# Patient Record
Sex: FEMALE | Race: BLACK OR AFRICAN AMERICAN | NOT HISPANIC OR LATINO | Employment: FULL TIME | URBAN - METROPOLITAN AREA
[De-identification: names, ages, dates, MRNs, and addresses within clinical notes are randomized per-mention and may not be internally consistent; named-entity substitution may affect disease eponyms.]

---

## 2019-01-24 ENCOUNTER — HOSPITAL ENCOUNTER (EMERGENCY)
Facility: HOSPITAL | Age: 59
Discharge: HOME | End: 2019-01-24
Attending: EMERGENCY MEDICINE
Payer: COMMERCIAL

## 2019-01-24 VITALS
HEART RATE: 67 BPM | HEIGHT: 66 IN | OXYGEN SATURATION: 100 % | BODY MASS INDEX: 27.64 KG/M2 | TEMPERATURE: 99 F | WEIGHT: 172 LBS | RESPIRATION RATE: 17 BRPM | DIASTOLIC BLOOD PRESSURE: 96 MMHG | SYSTOLIC BLOOD PRESSURE: 168 MMHG

## 2019-01-24 DIAGNOSIS — R07.89 ATYPICAL CHEST PAIN: ICD-10-CM

## 2019-01-24 DIAGNOSIS — I16.0 ASYMPTOMATIC HYPERTENSIVE URGENCY: Primary | ICD-10-CM

## 2019-01-24 LAB
ALBUMIN SERPL-MCNC: 3.9 G/DL (ref 3.3–5.5)
ALP SERPL-CCNC: 80 U/L (ref 42–141)
BILIRUB SERPL-MCNC: 0.7 MG/DL (ref 0.2–1.6)
BILIRUBIN, POC UA: NEGATIVE
BLOOD, POC UA: NEGATIVE
BUN SERPL-MCNC: 14 MG/DL (ref 7–22)
CALCIUM SERPL-MCNC: 9.2 MG/DL (ref 8–10.3)
CHLORIDE SERPL-SCNC: 104 MMOL/L (ref 98–108)
CLARITY, POC UA: ABNORMAL
COLOR, POC UA: YELLOW
CREAT SERPL-MCNC: 0.8 MG/DL (ref 0.6–1.2)
GLUCOSE SERPL-MCNC: 101 MG/DL (ref 73–118)
GLUCOSE, POC UA: NEGATIVE
KETONES, POC UA: NEGATIVE
LEUKOCYTE EST, POC UA: ABNORMAL
NITRITE, POC UA: NEGATIVE
PH UR STRIP: 7 [PH]
POC ALT (SGPT): 28 U/L (ref 10–47)
POC AST (SGOT): 28 U/L (ref 11–38)
POC TCO2: 27 MMOL/L (ref 18–33)
POTASSIUM BLD-SCNC: 3.5 MMOL/L (ref 3.6–5.1)
PROTEIN, POC UA: NEGATIVE
PROTEIN, POC: 7.9 G/DL (ref 6.4–8.1)
SODIUM BLD-SCNC: 141 MMOL/L (ref 128–145)
SPECIFIC GRAVITY, POC UA: 1.02
UROBILINOGEN, POC UA: 0.2 E.U./DL

## 2019-01-24 PROCEDURE — 81003 URINALYSIS AUTO W/O SCOPE: CPT | Mod: ER

## 2019-01-24 PROCEDURE — 85025 COMPLETE CBC W/AUTO DIFF WBC: CPT | Mod: ER

## 2019-01-24 PROCEDURE — 25000003 PHARM REV CODE 250: Mod: ER | Performed by: EMERGENCY MEDICINE

## 2019-01-24 PROCEDURE — 93010 EKG 12-LEAD: ICD-10-PCS | Mod: ,,, | Performed by: INTERNAL MEDICINE

## 2019-01-24 PROCEDURE — 80053 COMPREHEN METABOLIC PANEL: CPT | Mod: ER

## 2019-01-24 PROCEDURE — 93005 ELECTROCARDIOGRAM TRACING: CPT | Mod: ER

## 2019-01-24 PROCEDURE — 99283 EMERGENCY DEPT VISIT LOW MDM: CPT | Mod: ER

## 2019-01-24 PROCEDURE — 93010 ELECTROCARDIOGRAM REPORT: CPT | Mod: ,,, | Performed by: INTERNAL MEDICINE

## 2019-01-24 RX ORDER — AMLODIPINE BESYLATE 5 MG/1
5 TABLET ORAL DAILY
Qty: 90 TABLET | Refills: 0 | Status: SHIPPED | OUTPATIENT
Start: 2019-01-24 | End: 2023-03-15

## 2019-01-24 RX ORDER — LABETALOL 100 MG/1
100 TABLET, FILM COATED ORAL 2 TIMES DAILY
COMMUNITY
End: 2023-03-15

## 2019-01-24 RX ORDER — AMLODIPINE BESYLATE 5 MG/1
5 TABLET ORAL
Status: COMPLETED | OUTPATIENT
Start: 2019-01-24 | End: 2019-01-24

## 2019-01-24 RX ORDER — AMLODIPINE BESYLATE 5 MG/1
5 TABLET ORAL 2 TIMES DAILY
Qty: 180 TABLET | Refills: 0 | Status: SHIPPED | OUTPATIENT
Start: 2019-01-24 | End: 2019-01-24 | Stop reason: SDUPTHER

## 2019-01-24 RX ADMIN — AMLODIPINE BESYLATE 5 MG: 5 TABLET ORAL at 03:01

## 2019-01-24 NOTE — DISCHARGE INSTRUCTIONS
It is important that you followed up to determine if the medications that I prescribed you needs to be adjusted.  I have listed few different sources that you can call to get an appointment.

## 2019-01-24 NOTE — ED PROVIDER NOTES
Encounter Date: 2019    SCRIBE #1 NOTE: I, Carolee Meléndez, am scribing for, and in the presence of,  Dr. Ruiz. I have scribed the following portions of the note - Other sections scribed: HPI, ROS, PE.       History     Chief Complaint   Patient presents with    high blood pressure    tingling in my chest     Akiko Esparza is a 58 y.o. female who presents to the ED s/p a high blood pressure reading at a clinic today with mild tingling in her chest, which she has been having for one year. She was out of her BP medication due to lack of insurance, but she recently got insurance again. She saw a doctor at a clinic today (not the clinic she normally goes to) and was told she might have a cardiac issue or stroke due to her high blood pressure and tingling in chest. She was not given any antihypertensives today at the clinic.  Denies weakness in arms or legs, speech difficulty, or headache. No peripheral edema.  No shortness of breath.      The history is provided by the patient. No  was used.     Review of patient's allergies indicates:  No Known Allergies  Past Medical History:   Diagnosis Date    GERD (gastroesophageal reflux disease)     Hypertension      Past Surgical History:   Procedure Laterality Date     SECTION       &      Family History   Problem Relation Age of Onset    Arthritis Mother     Diabetes Mother     Hypertension Mother     Vision loss Mother         wears glasses    Arthritis Father     Diabetes Father         borderline-DrDain's are monitoring    Hypertension Father     Vision loss Father         wears glasses    Arthritis Sister     Asthma Sister     Hypertension Sister         borderline    Vision loss Sister         wears glasses    Vision loss Daughter         wears glasses    Stroke Paternal Grandfather     Breast cancer Neg Hx     Colon cancer Neg Hx     Ovarian cancer Neg Hx      Social History     Tobacco Use    Smoking  status: Never Smoker    Smokeless tobacco: Never Used   Substance Use Topics    Alcohol use: Yes     Alcohol/week: 0.5 oz     Types: 1 Standard drinks or equivalent per week    Drug use: No     Review of Systems   Constitutional: Negative for appetite change.   Eyes: Negative for visual disturbance.   Respiratory: Negative for shortness of breath.    Cardiovascular: Positive for chest pain (tingling in chest).   Gastrointestinal: Negative for constipation and diarrhea.   Genitourinary: Negative for difficulty urinating.   Neurological: Negative for dizziness, weakness and headaches.   All other systems reviewed and are negative.      Physical Exam     Initial Vitals [01/24/19 1532]   BP Pulse Resp Temp SpO2   (!) 240/145 84 18 -- 100 %      MAP       --         Physical Exam    Nursing note and vitals reviewed.  Constitutional: She appears well-developed and well-nourished. She appears distressed (minimal).   HENT:   Head: Normocephalic and atraumatic.   Right Ear: External ear normal.   Left Ear: External ear normal.   Eyes: Conjunctivae are normal.   Neck: Normal range of motion. Neck supple. No JVD present.   Cardiovascular: Normal rate, regular rhythm and intact distal pulses.   Murmur (soft, systolic) heard.  Pulmonary/Chest: Effort normal and breath sounds normal. No respiratory distress. She has no wheezes. She has no rhonchi. She has no rales.   Mild tingling in anterior chest wall.   Abdominal: Soft. There is no tenderness.   Musculoskeletal: Normal range of motion.   Neurological: She is alert and oriented to person, place, and time. She has normal strength. No cranial nerve deficit or sensory deficit. Coordination and gait normal. GCS eye subscore is 4. GCS verbal subscore is 5. GCS motor subscore is 6.   The patient has no nystagmus.  There is normal finger to nose and rapid alternating hand movements.  There is no ataxia.  There are no visual field defects.     Skin: Skin is warm and dry.    Psychiatric: She has a normal mood and affect. Her behavior is normal.         ED Course   Procedures  Labs Reviewed   URINALYSIS, REFLEX TO URINE CULTURE   POCT CBC   POCT CMP          Imaging Results    None          Medical Decision Making:   Clinical Tests:   Medical Tests: Ordered and Reviewed            Scribe Attestation:   Scribe #1: I performed the above scribed service and the documentation accurately describes the services I performed. I attest to the accuracy of the note.    I attest that I personally performed the services documented by the scribe and acknowledged and confirm the content of the note. Pedro Ruiz              ED Course as of Jan 24 1615   Thu Jan 24, 2019   1550 My independent interpretation of the EKG is normal sinus rhythm with normal axis and normal intervals.  There is no ST segment elevation or depression. EKG 12-lead []   1613 CMP is within normal limits POC Potassium: (!) 3.5 [MH]   1614 UA is within normal limits Glucose, UA: (!) Negative [MH]   1614 CBC is within normal limits  []      ED Course User Index  [MH] Pedro Ruiz MD     Clinical Impression:     1. Asymptomatic hypertensive urgency    2. Atypical chest pain                                   Pedro Ruiz MD  01/24/19 1550       Pedro Ruiz MD  01/24/19 1616

## 2021-03-25 ENCOUNTER — HOSPITAL ENCOUNTER (OUTPATIENT)
Dept: RADIOLOGY | Facility: HOSPITAL | Age: 61
Discharge: HOME | End: 2021-03-25
Attending: OBSTETRICS & GYNECOLOGY
Payer: COMMERCIAL

## 2021-03-25 ENCOUNTER — OFFICE VISIT (OUTPATIENT)
Dept: OBSTETRICS AND GYNECOLOGY | Facility: CLINIC | Age: 61
End: 2021-03-25
Attending: OBSTETRICS & GYNECOLOGY
Payer: COMMERCIAL

## 2021-03-25 VITALS
SYSTOLIC BLOOD PRESSURE: 160 MMHG | WEIGHT: 172.38 LBS | HEIGHT: 66 IN | DIASTOLIC BLOOD PRESSURE: 110 MMHG | BODY MASS INDEX: 27.7 KG/M2

## 2021-03-25 VITALS — HEIGHT: 66 IN | BODY MASS INDEX: 27.64 KG/M2 | WEIGHT: 172 LBS

## 2021-03-25 DIAGNOSIS — Z01.419 WELL WOMAN EXAM: Primary | ICD-10-CM

## 2021-03-25 DIAGNOSIS — Z12.31 ENCOUNTER FOR SCREENING MAMMOGRAM FOR MALIGNANT NEOPLASM OF BREAST: ICD-10-CM

## 2021-03-25 PROCEDURE — 87624 HPV HI-RISK TYP POOLED RSLT: CPT | Performed by: OBSTETRICS & GYNECOLOGY

## 2021-03-25 PROCEDURE — 99386 PR PREVENTIVE VISIT,NEW,40-64: ICD-10-PCS | Mod: S$GLB,,, | Performed by: OBSTETRICS & GYNECOLOGY

## 2021-03-25 PROCEDURE — 99386 PREV VISIT NEW AGE 40-64: CPT | Mod: S$GLB,,, | Performed by: OBSTETRICS & GYNECOLOGY

## 2021-03-25 PROCEDURE — 77067 MAMMO DIGITAL SCREENING BILAT WITH TOMO: ICD-10-PCS | Mod: 26,,, | Performed by: RADIOLOGY

## 2021-03-25 PROCEDURE — 88175 CYTOPATH C/V AUTO FLUID REDO: CPT | Performed by: OBSTETRICS & GYNECOLOGY

## 2021-03-25 PROCEDURE — 77067 SCR MAMMO BI INCL CAD: CPT | Mod: TC

## 2021-03-25 PROCEDURE — 99999 PR PBB SHADOW E&M-EST. PATIENT-LVL III: CPT | Mod: PBBFAC,,, | Performed by: OBSTETRICS & GYNECOLOGY

## 2021-03-25 PROCEDURE — 77067 SCR MAMMO BI INCL CAD: CPT | Mod: 26,,, | Performed by: RADIOLOGY

## 2021-03-25 PROCEDURE — 77063 BREAST TOMOSYNTHESIS BI: CPT | Mod: 26,,, | Performed by: RADIOLOGY

## 2021-03-25 PROCEDURE — 77063 MAMMO DIGITAL SCREENING BILAT WITH TOMO: ICD-10-PCS | Mod: 26,,, | Performed by: RADIOLOGY

## 2021-03-25 PROCEDURE — 99999 PR PBB SHADOW E&M-EST. PATIENT-LVL III: ICD-10-PCS | Mod: PBBFAC,,, | Performed by: OBSTETRICS & GYNECOLOGY

## 2021-03-25 RX ORDER — DICYCLOMINE HYDROCHLORIDE 20 MG/1
20 TABLET ORAL
COMMUNITY
Start: 2020-11-20 | End: 2021-11-20

## 2021-03-25 RX ORDER — OLMESARTAN MEDOXOMIL 20 MG/1
20 TABLET ORAL
COMMUNITY
Start: 2020-11-20 | End: 2021-11-20

## 2021-03-25 RX ORDER — CIMETIDINE 400 MG/1
800 TABLET, FILM COATED ORAL
COMMUNITY
Start: 2021-03-23 | End: 2023-03-15

## 2021-03-29 LAB
FINAL PATHOLOGIC DIAGNOSIS: NORMAL
Lab: NORMAL

## 2021-03-31 LAB
HPV HR 12 DNA SPEC QL NAA+PROBE: NEGATIVE
HPV16 AG SPEC QL: NEGATIVE
HPV18 DNA SPEC QL NAA+PROBE: NEGATIVE

## 2021-04-15 ENCOUNTER — PATIENT MESSAGE (OUTPATIENT)
Dept: RESEARCH | Facility: HOSPITAL | Age: 61
End: 2021-04-15

## 2021-08-15 ENCOUNTER — PATIENT MESSAGE (OUTPATIENT)
Dept: OBSTETRICS AND GYNECOLOGY | Facility: CLINIC | Age: 61
End: 2021-08-15

## 2021-09-11 ENCOUNTER — TELEPHONE (OUTPATIENT)
Dept: OBSTETRICS AND GYNECOLOGY | Facility: CLINIC | Age: 61
End: 2021-09-11

## 2023-03-15 ENCOUNTER — OFFICE VISIT (OUTPATIENT)
Dept: OBSTETRICS AND GYNECOLOGY | Facility: CLINIC | Age: 63
End: 2023-03-15
Payer: COMMERCIAL

## 2023-03-15 ENCOUNTER — TELEPHONE (OUTPATIENT)
Dept: OBSTETRICS AND GYNECOLOGY | Facility: CLINIC | Age: 63
End: 2023-03-15
Payer: COMMERCIAL

## 2023-03-15 ENCOUNTER — HOSPITAL ENCOUNTER (EMERGENCY)
Facility: HOSPITAL | Age: 63
Discharge: HOME | End: 2023-03-15
Attending: EMERGENCY MEDICINE
Payer: COMMERCIAL

## 2023-03-15 VITALS
WEIGHT: 171.94 LBS | BODY MASS INDEX: 27.63 KG/M2 | HEIGHT: 66 IN | SYSTOLIC BLOOD PRESSURE: 200 MMHG | DIASTOLIC BLOOD PRESSURE: 120 MMHG

## 2023-03-15 VITALS
DIASTOLIC BLOOD PRESSURE: 90 MMHG | HEART RATE: 60 BPM | WEIGHT: 183 LBS | TEMPERATURE: 98 F | OXYGEN SATURATION: 98 % | BODY MASS INDEX: 29.41 KG/M2 | SYSTOLIC BLOOD PRESSURE: 144 MMHG | HEIGHT: 66 IN | RESPIRATION RATE: 20 BRPM

## 2023-03-15 DIAGNOSIS — Z12.31 SCREENING MAMMOGRAM FOR BREAST CANCER: ICD-10-CM

## 2023-03-15 DIAGNOSIS — N89.8 VAGINAL IRRITATION: Primary | ICD-10-CM

## 2023-03-15 DIAGNOSIS — Z12.4 SCREENING FOR CERVICAL CANCER: ICD-10-CM

## 2023-03-15 DIAGNOSIS — N95.8 GENITOURINARY SYNDROME OF MENOPAUSE: ICD-10-CM

## 2023-03-15 DIAGNOSIS — I16.0 HYPERTENSIVE URGENCY: Primary | ICD-10-CM

## 2023-03-15 DIAGNOSIS — Z11.51 SCREENING FOR HPV (HUMAN PAPILLOMAVIRUS): ICD-10-CM

## 2023-03-15 LAB
ALBUMIN SERPL-MCNC: 4.3 G/DL (ref 3.3–5.5)
ALP SERPL-CCNC: 88 U/L (ref 42–141)
BILIRUB SERPL-MCNC: 0.7 MG/DL (ref 0.2–1.6)
BILIRUBIN, POC UA: NEGATIVE
BLOOD, POC UA: NEGATIVE
BUN SERPL-MCNC: 15 MG/DL (ref 7–22)
CALCIUM SERPL-MCNC: 9.8 MG/DL (ref 8–10.3)
CHLORIDE SERPL-SCNC: 104 MMOL/L (ref 98–108)
CLARITY, POC UA: CLEAR
COLOR, POC UA: ABNORMAL
CREAT SERPL-MCNC: 0.9 MG/DL (ref 0.6–1.2)
GLUCOSE SERPL-MCNC: 98 MG/DL (ref 73–118)
GLUCOSE, POC UA: NEGATIVE
KETONES, POC UA: NEGATIVE
LEUKOCYTE EST, POC UA: ABNORMAL
NITRITE, POC UA: NEGATIVE
PH UR STRIP: 7 [PH]
POC ALT (SGPT): 20 U/L (ref 10–47)
POC AST (SGOT): 24 U/L (ref 11–38)
POC TCO2: 27 MMOL/L (ref 18–33)
POCT GLUCOSE: 91 MG/DL (ref 70–110)
POTASSIUM BLD-SCNC: 3.6 MMOL/L (ref 3.6–5.1)
PROTEIN, POC UA: NEGATIVE
PROTEIN, POC: 8.3 G/DL (ref 6.4–8.1)
SODIUM BLD-SCNC: 142 MMOL/L (ref 128–145)
SPECIFIC GRAVITY, POC UA: 1.02
UROBILINOGEN, POC UA: 0.2 E.U./DL

## 2023-03-15 PROCEDURE — 82962 GLUCOSE BLOOD TEST: CPT | Mod: ER

## 2023-03-15 PROCEDURE — 99999 PR PBB SHADOW E&M-EST. PATIENT-LVL III: ICD-10-PCS | Mod: PBBFAC,,, | Performed by: STUDENT IN AN ORGANIZED HEALTH CARE EDUCATION/TRAINING PROGRAM

## 2023-03-15 PROCEDURE — 99283 EMERGENCY DEPT VISIT LOW MDM: CPT | Mod: ER,25

## 2023-03-15 PROCEDURE — 81003 URINALYSIS AUTO W/O SCOPE: CPT | Mod: ER

## 2023-03-15 PROCEDURE — 85025 COMPLETE CBC W/AUTO DIFF WBC: CPT | Mod: ER

## 2023-03-15 PROCEDURE — 99214 PR OFFICE/OUTPT VISIT, EST, LEVL IV, 30-39 MIN: ICD-10-PCS | Mod: S$GLB,,, | Performed by: STUDENT IN AN ORGANIZED HEALTH CARE EDUCATION/TRAINING PROGRAM

## 2023-03-15 PROCEDURE — 99214 OFFICE O/P EST MOD 30 MIN: CPT | Mod: S$GLB,,, | Performed by: STUDENT IN AN ORGANIZED HEALTH CARE EDUCATION/TRAINING PROGRAM

## 2023-03-15 PROCEDURE — 88175 CYTOPATH C/V AUTO FLUID REDO: CPT | Performed by: STUDENT IN AN ORGANIZED HEALTH CARE EDUCATION/TRAINING PROGRAM

## 2023-03-15 PROCEDURE — 99999 PR PBB SHADOW E&M-EST. PATIENT-LVL III: CPT | Mod: PBBFAC,,, | Performed by: STUDENT IN AN ORGANIZED HEALTH CARE EDUCATION/TRAINING PROGRAM

## 2023-03-15 PROCEDURE — 80053 COMPREHEN METABOLIC PANEL: CPT | Mod: ER

## 2023-03-15 PROCEDURE — 25000003 PHARM REV CODE 250: Mod: ER | Performed by: EMERGENCY MEDICINE

## 2023-03-15 PROCEDURE — 81514 NFCT DS BV&VAGINITIS DNA ALG: CPT | Performed by: STUDENT IN AN ORGANIZED HEALTH CARE EDUCATION/TRAINING PROGRAM

## 2023-03-15 PROCEDURE — 87624 HPV HI-RISK TYP POOLED RSLT: CPT | Performed by: STUDENT IN AN ORGANIZED HEALTH CARE EDUCATION/TRAINING PROGRAM

## 2023-03-15 PROCEDURE — 96374 THER/PROPH/DIAG INJ IV PUSH: CPT | Mod: ER

## 2023-03-15 RX ORDER — CLONIDINE HYDROCHLORIDE 0.1 MG/1
0.1 TABLET ORAL
Status: COMPLETED | OUTPATIENT
Start: 2023-03-15 | End: 2023-03-15

## 2023-03-15 RX ORDER — ESTRADIOL 0.1 MG/G
0.5 CREAM VAGINAL NIGHTLY
Qty: 42.5 G | Refills: 1 | Status: SHIPPED | OUTPATIENT
Start: 2023-03-15 | End: 2024-03-13

## 2023-03-15 RX ORDER — IBUPROFEN 600 MG/1
600 TABLET ORAL
Status: DISCONTINUED | OUTPATIENT
Start: 2023-03-15 | End: 2023-03-15 | Stop reason: HOSPADM

## 2023-03-15 RX ORDER — LABETALOL HYDROCHLORIDE 5 MG/ML
20 INJECTION, SOLUTION INTRAVENOUS
Status: DISCONTINUED | OUTPATIENT
Start: 2023-03-15 | End: 2023-03-15

## 2023-03-15 RX ORDER — OLMESARTAN MEDOXOMIL 20 MG/1
20 TABLET ORAL
COMMUNITY
Start: 2022-10-27

## 2023-03-15 RX ORDER — FLUTICASONE PROPIONATE 50 MCG
1 SPRAY, SUSPENSION (ML) NASAL
COMMUNITY
Start: 2022-04-13 | End: 2023-04-13

## 2023-03-15 RX ADMIN — CLONIDINE HYDROCHLORIDE 0.1 MG: 0.1 TABLET ORAL at 04:03

## 2023-03-15 NOTE — PROGRESS NOTES
OBSTETRICS AND GYNECOLOGY    Subjective:      Chief Complaint:  Vaginal Dryness    HPI:  Akiko Esparza is an 62 y.o.  presenting with concerns of vaginal dryness/discomfort.  Started a few years ago. Appreciated on both internal and external aspects, more prominent on external aspect. No rash, bleeding, itch, odor, or discharge. No prior episodes. LMP about 20 years ago. No new partners. No new soap, detergent, laundry, or products. Home treatments used - vagisil, without improvement. Progressively worsening. Sometimes can appreciate the dryness when walking, but mainly dyspareunia. Pain with entry and penetration, some spotting after intercourse due to the irritation. Area feels dry.     Review of systems:  Denies abnormal vaginal bleeding or discharge, hematuria, or dysuria.     OB History    Para Term  AB Living   2 2 2     2   SAB IAB Ectopic Multiple Live Births           2      # Outcome Date GA Lbr Sonido/2nd Weight Sex Delivery Anes PTL Lv   2 Term 04    M CS-Unspec EPI  RACHAEL   1 Term 91    F CS-Unspec EPI  RACHAEL     Past Medical History:   Diagnosis Date    GERD (gastroesophageal reflux disease)     Hypertension      Past Surgical History:   Procedure Laterality Date     SECTION       &      Family History   Problem Relation Age of Onset    Arthritis Mother     Diabetes Mother     Hypertension Mother     Vision loss Mother         wears glasses    Arthritis Father     Diabetes Father         borderline-DrDain's are monitoring    Hypertension Father     Vision loss Father         wears glasses    Arthritis Sister     Asthma Sister     Hypertension Sister         borderline    Vision loss Sister         wears glasses    Vision loss Daughter         wears glasses    Stroke Paternal Grandfather     Breast cancer Neg Hx     Colon cancer Neg Hx     Ovarian cancer Neg Hx        Allergies: Review of patient's allergies indicates:  No Known Allergies    Medications:  "  Current Outpatient Medications   Medication Sig Dispense Refill    fluticasone propionate (FLONASE) 50 mcg/actuation nasal spray 1 spray by Nasal route.      olmesartan (BENICAR) 20 MG tablet Take 20 mg by mouth.       No current facility-administered medications for this visit.       Social History     Tobacco Use    Smoking status: Never    Smokeless tobacco: Never   Substance Use Topics    Alcohol use: Not Currently     Alcohol/week: 0.8 standard drinks     Types: 1 Standard drinks or equivalent per week       Objective:   BP (!) 200/120 (BP Location: Right arm, Patient Position: Sitting)   Ht 5' 6" (1.676 m)   Wt 78 kg (171 lb 15.3 oz)   LMP  (LMP Unknown)   BMI 27.75 kg/m²   Physical Exam  Genitourinary:            GENERAL: Alert, well dressed, well nourished. Appropriate mood and affect. Pleasant. Makes eye contact.   HEENT: Normocephalic, atraumatic. Extraocular movements intact. Hearing and vision grossly intact.   PULMONARY: No respiratory distress. No use of accessory muscles of respiration. No cyanosis. Speaking comfortably in full sentences.   CARDIAC: Well perfused.    ABDOMEN: Soft.    EXTREMITIES: No edema. No visible rashes.   NEURO: Gait witnessed and WNL.    PELVIC:   EXTERNAL GENITALIA: No lesions or masses, non-erythematous. Atrophy medial labia minora bilaterally.  URETHRA: Patent, no discharge.   VAGINA: Pink, moist, rugated, no discharge.   CERVIX: Nulliparous appearing, no masses, os stenotic and closed, no blood or discharge per os, no cervical motion tenderness. Cervix 3x3cm. Very slightly raised, slightly hypopigmented topography at approximately 5-6:00. At approximately 3:00 is a superficial cut/laceration about 0.3cm in length and 0.4mm in width with slight ooze of bleeding. Just superior to this is a pinpoint area of a slight ooze of bright red blood. Silver nitrate applied to both areas with hemostasis achieved. Pap smear obtained.   UTERUS: Firm, mobile, midline, non-tender. "   ADNEXA: Non-tender, non-palpable.    Assessment/Plan:     Vulvovaginal Irritation  - Ddx:  GSM, yeast, BV, allergic reaction, infectious, dysplasia/malignancy  - Exam as above  Denies recent intercourse, use of toys, reports is safe in her relationship  - Pap smear obtained today, LPS 3/2021 NILM, HPV(-)  - Affirm today  - Discussed options, including vaseline, coconut oil, topical hyaluronic acid, estrogen cream  Will trial estrogen cream at this time, rx sent, nightly x 2 weeks then transition to maintenance dose  - Vulvar hygiene measures discussed, including avoid introduction of new products at this time, avoid colored/scented toilet paper/products  - Discussed biopsy if cervical findings above persist/fail to improve; consider infection panel  - Follow up results, for plan/treatment course, and in 2 weeks for repeat cervical evaluation  - Follow up for annual WWE with primary OB/GYN Dr. Caldwell    HTN Urgency  - Manual BPs taken by myself and nursing, appropriately sized cuff, no recent caffeine  180-200s/120-130s  - Took BP medication today  - Contacted PCP's office, Dr. Humberto Chamberlain at [phone], to update; provided this information to staff who said they would send message through to PCP/his nurse  - Encourage patient to contact PCP as well  - Denies CP, SOB, HA, vision changes/spots/flashes  - Rec to ER now, patient agreeable  - Ambulance offered, as patient reports feeling at usual state of health, will drive            As of April 1, 2021, the Cures Act has been passed nationally. This new law requires that all doctors progress notes, lab results, pathology reports and radiology reports be released IMMEDIATELY to the patient in the patient portal. That means that the results are released to you at the EXACT same time they are released to me. Therefore, with all of the patients that I have I am not able to reply to each patient exactly when the results come in. So there will be a delay from  when you see the results to when I see them and have time to come up with a response to send you. Also I only see these results when I am on the computer at work. So if the results come in over the weekend or after 5 pm of a work day, I will not see them until the next business day. As you can tell, this is a challenge as a physician to give every patient the quick response they hope for and deserve. So please be patient!   Thanks for your understanding and patience.    Answers submitted by the patient for this visit:  Vaginal Pain Questionnaire (Submitted on 3/15/2023)  Chief Complaint: Vaginal pain  Chronicity: new  Onset: more than 1 year ago  Pain severity: moderate  Affected side: both  Pregnant now?: No  abdominal pain: No  anorexia: No  chills: No  discolored urine: No  dysuria: No  fever: No  frequency: No  nausea: No  painful intercourse: Yes  rash: No  urgency: No  vomiting: No  Aggravated by: intercourse  Improvement on treatment: no relief  Sexual activity: sexually active  Partner with STD symptoms: no  Birth control: condoms  Menstrual history: postmenopausal   section: Yes  ovarian cysts: Yes

## 2023-03-15 NOTE — ED PROVIDER NOTES
Encounter Date: 3/15/2023    SCRIBE #1 NOTE: I, Ephraim Rai, am scribing for, and in the presence of,  Trixie Wang MD.     History     Chief Complaint   Patient presents with    Hypertension     Pt states she was at PCP appointment and BP was 198 systolic, pt states she only feels like she has sinus congestion denies dizziness denies weakness denies N&V      63 y/o female with HTN presents to the ED after elevated blood pressure readings at a doctor's appointment today. Pt was seen for GYN issue and significantly elevated BP was noted. She did take her BP medication today. She has not checked her BP since an ER visit in December. She denies any chest pain, shortness of breath, headache, vision changes, leg swelling, loss of appetite, urinary complaints, or any other symptoms. She is compliant with her medications. She has her annual visit with her PCP next week. NKDA.     The history is provided by the patient. No  was used.   Review of patient's allergies indicates:  No Known Allergies  Past Medical History:   Diagnosis Date    GERD (gastroesophageal reflux disease)     Hypertension      Past Surgical History:   Procedure Laterality Date     SECTION       &      Family History   Problem Relation Age of Onset    Arthritis Mother     Diabetes Mother     Hypertension Mother     Vision loss Mother         wears glasses    Arthritis Father     Diabetes Father         borderline-Dr.'s are monitoring    Hypertension Father     Vision loss Father         wears glasses    Arthritis Sister     Asthma Sister     Hypertension Sister         borderline    Vision loss Sister         wears glasses    Vision loss Daughter         wears glasses    Stroke Paternal Grandfather     Breast cancer Neg Hx     Colon cancer Neg Hx     Ovarian cancer Neg Hx      Social History     Tobacco Use    Smoking status: Never    Smokeless tobacco: Never   Substance Use Topics    Alcohol use: Not Currently      Alcohol/week: 0.8 standard drinks     Types: 1 Standard drinks or equivalent per week    Drug use: No     Review of Systems   Constitutional:  Negative for activity change, appetite change, chills and fever.   HENT:  Negative for congestion, rhinorrhea, sneezing and sore throat.    Respiratory:  Negative for cough, choking, shortness of breath and wheezing.    Cardiovascular:  Negative for chest pain and palpitations.   Gastrointestinal:  Negative for abdominal pain, diarrhea, nausea and vomiting.   Neurological:  Negative for dizziness, syncope, light-headedness and headaches.   All other systems reviewed and are negative.    Physical Exam     Initial Vitals [03/15/23 1627]   BP Pulse Resp Temp SpO2   (!) 225/130 78 20 98.5 °F (36.9 °C) 99 %      MAP       --         Physical Exam    Nursing note and vitals reviewed.  Constitutional: She appears well-developed and well-nourished. No distress.   HENT:   Head: Normocephalic and atraumatic.   Eyes: Conjunctivae are normal.   Neck:   Normal range of motion.  Cardiovascular:  Normal rate, regular rhythm and normal heart sounds.           No murmur heard.  Pulmonary/Chest: Breath sounds normal. No respiratory distress.   Abdominal: Abdomen is soft. Bowel sounds are normal. She exhibits no distension. There is no abdominal tenderness.   Musculoskeletal:         General: No tenderness or edema. Normal range of motion.      Cervical back: Normal range of motion.     Neurological: She is alert and oriented to person, place, and time. GCS score is 15. GCS eye subscore is 4. GCS verbal subscore is 5. GCS motor subscore is 6.   Skin: Skin is warm and dry.   Psychiatric: She has a normal mood and affect. Her behavior is normal.       ED Course   Procedures  Labs Reviewed   POCT URINALYSIS W/O SCOPE - Abnormal; Notable for the following components:       Result Value    Leukocytes, UA 1+ (*)     All other components within normal limits   POCT CMP - Abnormal; Notable for the  following components:    Protein, POC 8.3 (*)     All other components within normal limits   POCT CBC   POCT URINALYSIS W/O SCOPE   POCT CMP   POCT GLUCOSE          Imaging Results    None          Medications   ibuprofen tablet 600 mg (600 mg Oral Not Given 3/15/23 1645)   cloNIDine tablet 0.1 mg (0.1 mg Oral Given 3/15/23 1645)     Medical Decision Making:   ED Management:  61 y/o female with HTN presents to the ED after abnormally high blood pressure readings at a doctor's appointment today. She has no symptoms. On exam, BP is elevated. No distress, neuro intact. She was given clonidine 0.1mg. Labs with no evidence of acute end organ damage. Leukocytes in urine but she reports no UTI symptoms and that is was not a midstream clean catch. Will not treat. BP improved, pt is asymptomatic and labs are normal. Will have her take her normal meds in the morning and check her BP 2 hours later. Pt advised to contact her PCP if BP is elevated tomorrow.        Scribe Attestation:   Scribe #1: I performed the above scribed service and the documentation accurately describes the services I performed. I attest to the accuracy of the note.            I, Dr. Trixie Wang, personally performed the services described in this documentation.   All medical record entries made by the scribe were at my direction and in my presence.   I have reviewed the chart and agree that the record is accurate and complete.   Trixie Wang MD.  5:06 PM 03/15/2023        Clinical Impression:   Final diagnoses:  [I16.0] Hypertensive urgency (Primary)        ED Disposition Condition    Discharge Stable          ED Prescriptions    None       Follow-up Information       Follow up With Specialties Details Why Contact Info    [facility] MD Family Medicine Call in 1 day  [address]  [place] [address]  [phone]               Trixie Wang MD  03/15/23 4292

## 2023-03-15 NOTE — TELEPHONE ENCOUNTER
----- Message from Nir Keating sent at 3/15/2023  9:05 AM CDT -----  Pt returning our call [phone]

## 2023-03-15 NOTE — TELEPHONE ENCOUNTER
----- Message from Blanka Lin sent at 3/15/2023  8:46 AM CDT -----  Regarding: Pt called to request a work in appt for vaginal dryness and pt would like a call back this morning  Same Day Appointment Access Request:    Pt called to request a work in appt for vaginal dryness and pt would like a call back this morning    Pt can be reached at [phone]

## 2023-03-15 NOTE — DISCHARGE INSTRUCTIONS
Your blood pressure was severely elevated today. You were given a medicine in the ER and it improved significantly. Tomorrow, check your BP about 2 hours after taking your medicine. If it is high >175/100, call your doctor. You may be able to take an additional does of your medicine, then take 2 daily instead of 1 until you follow up in clinic. Do not do this without talking to your doctor first. Your labs were normal.  Return to ER for any concerns or worsening symptoms.

## 2023-03-15 NOTE — TELEPHONE ENCOUNTER
Spoke with pt  Pt scheduled for same day appt as she is off today and having some vaginal dryness issues.  Pt scheduled and verbalized understanding of location.

## 2023-03-16 ENCOUNTER — TELEPHONE (OUTPATIENT)
Dept: OBSTETRICS AND GYNECOLOGY | Facility: CLINIC | Age: 63
End: 2023-03-16
Payer: COMMERCIAL

## 2023-03-16 NOTE — TELEPHONE ENCOUNTER
Contacted patient Akiko Esparza today, 3/16/2023, at approximately 12:01 PM. Patient identifier confirmed.   Discussed ER visit. Blood pressure improved, was able to be discharged. [facility] checked BP earlier today and was not as high as at our visit yesterday. [facility] plans to contact her PCP at her lunch break. Informed patient I also left word with his office yesterday. Denies HA, CP, SOB, vision changes today. [facility] feels at usual level of health. No acute complaints at this time.

## 2023-03-17 DIAGNOSIS — B96.89 BACTERIAL VAGINOSIS: Primary | ICD-10-CM

## 2023-03-17 DIAGNOSIS — N76.0 BACTERIAL VAGINOSIS: Primary | ICD-10-CM

## 2023-03-17 LAB
BACTERIAL VAGINOSIS DNA: POSITIVE
CANDIDA GLABRATA DNA: NEGATIVE
CANDIDA KRUSEI DNA: NEGATIVE
CANDIDA RRNA VAG QL PROBE: NEGATIVE
T VAGINALIS RRNA GENITAL QL PROBE: NEGATIVE

## 2023-03-17 RX ORDER — METRONIDAZOLE 500 MG/1
500 TABLET ORAL EVERY 12 HOURS
Qty: 14 TABLET | Refills: 0 | Status: SHIPPED | OUTPATIENT
Start: 2023-03-17 | End: 2023-03-24

## 2023-04-04 NOTE — PROGRESS NOTES
OBSTETRICS AND GYNECOLOGY    Subjective:      Chief Complaint:  Repeat pap smear    HPI:  Akiko Esparza is an 62 y.o.  presenting for scheduled appointment for repeat pap smear. Insufficient but HPV(-) At recent visit.    Using estrogen cream. Now using couple nights a week. No bleeding.   Completed the BV treatment.   Additional BP med added by PCP since last visit.     Review of systems:  Denies fever, abnormal vaginal bleeding or discharge, or dysuria.     OB History    Para Term  AB Living   2 2 2     2   SAB IAB Ectopic Multiple Live Births           2      # Outcome Date GA Lbr Sonido/2nd Weight Sex Delivery Anes PTL Lv   2 Term 04    M CS-Unspec EPI  RACHAEL   1 Term 91    F CS-Unspec EPI  RACHAEL     Past Medical History:   Diagnosis Date    GERD (gastroesophageal reflux disease)     Hypertension      Past Surgical History:   Procedure Laterality Date     SECTION       &      Family History   Problem Relation Age of Onset    Arthritis Mother     Diabetes Mother     Hypertension Mother     Vision loss Mother         wears glasses    Arthritis Father     Diabetes Father         borderline-Dr.'s are monitoring    Hypertension Father     Vision loss Father         wears glasses    Arthritis Sister     Asthma Sister     Hypertension Sister         borderline    Vision loss Sister         wears glasses    Vision loss Daughter         wears glasses    Stroke Paternal Grandfather     Breast cancer Neg Hx     Colon cancer Neg Hx     Ovarian cancer Neg Hx        Allergies: Review of patient's allergies indicates:  No Known Allergies    Medications:   Current Outpatient Medications   Medication Sig Dispense Refill    amLODIPine (NORVASC) 5 MG tablet Take 5 mg by mouth.      estradioL (ESTRACE) 0.01 % (0.1 mg/gram) vaginal cream Place 0.5 g vaginally every evening. Can use the applicator provided, or use pea sized amount on finger, apply vaginally every night before bed, for 2  "weeks. After that, use only 2-3 times per week at night before bed. 42.5 g 1    famotidine (PEPCID) 40 MG tablet Take 40 mg by mouth.      fluticasone propionate (FLONASE) 50 mcg/actuation nasal spray 1 spray by Nasal route.      olmesartan (BENICAR) 20 MG tablet Take 20 mg by mouth.       No current facility-administered medications for this visit.       Social History     Tobacco Use    Smoking status: Never    Smokeless tobacco: Never   Substance Use Topics    Alcohol use: Not Currently     Alcohol/week: 0.8 standard drinks     Types: 1 Standard drinks or equivalent per week       Objective:   BP (!) 128/100 (BP Location: Left arm, Patient Position: Sitting, BP Method: Medium (Manual))   Ht 5' 6" (1.676 m)   Wt 84.5 kg (186 lb 4.6 oz)   LMP  (LMP Unknown)   BMI 30.07 kg/m²   Physical Exam    GENERAL: Alert, well dressed, well nourished. Appropriate mood and affect. Pleasant.  HEENT: Normocephalic, atraumatic. Extraocular movements intact. Hearing and vision grossly intact.   PULMONARY: No respiratory distress. No use of accessory muscles of respiration. No cyanosis. Speaking comfortably in full sentences.   CARDIAC: Well perfused.    EXTREMITIES: No edema. No visible rashes.     PELVIC:   EXTERNAL GENITALIA: No lesions or masses, non-erythematous.  URETHRA: Patent, no discharge.   VAGINA: Pink, moist, rugated, no discharge.   CERVIX: Nulliparous appearing, no masses, os closed and stenotic, no blood or discharge per os, no cervical motion tenderness. Cervix 3x3cm. Superficial cut/lacerations previously visualized have resolved. Slightly friable with pap. Atrophy slightly improved!      Assessment/Plan:     Problem List Items Addressed This Visit    None  Visit Diagnoses       Cervical cancer screening    -  Primary    Relevant Orders    Liquid-Based Pap Smear, Screening        - Pap smear 3/2021 NILM, HPV(-)  - Pap smear 3/2023 unsatisfactory due to insufficient number of squamous cells, HPV(-)  - Repeat " today obtained  - Continue estrogen cream 2-3 times per week at night  - Follow up for annual WWE with primary OB/GYN Dr. Caldwell         As of April 1, 2021, the Cures Act has been passed nationally. This new law requires that all doctors progress notes, lab results, pathology reports and radiology reports be released IMMEDIATELY to the patient in the patient portal. That means that the results are released to you at the EXACT same time they are released to me. Therefore, with all of the patients that I have I am not able to reply to each patient exactly when the results come in. So there will be a delay from when you see the results to when I see them and have time to come up with a response to send you. Also I only see these results when I am on the computer at work. So if the results come in over the weekend or after 5 pm of a work day, I will not see them until the next business day. As you can tell, this is a challenge as a physician to give every patient the quick response they hope for and deserve. So please be patient!   Thanks for your understanding and patience.

## 2023-04-05 ENCOUNTER — OFFICE VISIT (OUTPATIENT)
Dept: OBSTETRICS AND GYNECOLOGY | Facility: CLINIC | Age: 63
End: 2023-04-05
Payer: COMMERCIAL

## 2023-04-05 ENCOUNTER — HOSPITAL ENCOUNTER (OUTPATIENT)
Dept: RADIOLOGY | Facility: HOSPITAL | Age: 63
Discharge: HOME | End: 2023-04-05
Attending: STUDENT IN AN ORGANIZED HEALTH CARE EDUCATION/TRAINING PROGRAM
Payer: COMMERCIAL

## 2023-04-05 VITALS
SYSTOLIC BLOOD PRESSURE: 128 MMHG | BODY MASS INDEX: 29.94 KG/M2 | WEIGHT: 186.31 LBS | DIASTOLIC BLOOD PRESSURE: 100 MMHG | HEIGHT: 66 IN

## 2023-04-05 VITALS — HEIGHT: 66 IN | BODY MASS INDEX: 29.89 KG/M2 | WEIGHT: 186 LBS

## 2023-04-05 DIAGNOSIS — Z12.31 SCREENING MAMMOGRAM FOR BREAST CANCER: ICD-10-CM

## 2023-04-05 DIAGNOSIS — Z12.4 CERVICAL CANCER SCREENING: Primary | ICD-10-CM

## 2023-04-05 PROCEDURE — 77067 MAMMO DIGITAL SCREENING BILAT WITH TOMO: ICD-10-PCS | Mod: 26,,, | Performed by: RADIOLOGY

## 2023-04-05 PROCEDURE — 99999 PR PBB SHADOW E&M-EST. PATIENT-LVL III: CPT | Mod: PBBFAC,,, | Performed by: STUDENT IN AN ORGANIZED HEALTH CARE EDUCATION/TRAINING PROGRAM

## 2023-04-05 PROCEDURE — 99999 PR PBB SHADOW E&M-EST. PATIENT-LVL III: ICD-10-PCS | Mod: PBBFAC,,, | Performed by: STUDENT IN AN ORGANIZED HEALTH CARE EDUCATION/TRAINING PROGRAM

## 2023-04-05 PROCEDURE — 77067 SCR MAMMO BI INCL CAD: CPT | Mod: TC

## 2023-04-05 PROCEDURE — 77067 SCR MAMMO BI INCL CAD: CPT | Mod: 26,,, | Performed by: RADIOLOGY

## 2023-04-05 PROCEDURE — 99212 OFFICE O/P EST SF 10 MIN: CPT | Mod: S$GLB,,, | Performed by: STUDENT IN AN ORGANIZED HEALTH CARE EDUCATION/TRAINING PROGRAM

## 2023-04-05 PROCEDURE — 77063 BREAST TOMOSYNTHESIS BI: CPT | Mod: 26,,, | Performed by: RADIOLOGY

## 2023-04-05 PROCEDURE — 77063 MAMMO DIGITAL SCREENING BILAT WITH TOMO: ICD-10-PCS | Mod: 26,,, | Performed by: RADIOLOGY

## 2023-04-05 PROCEDURE — 99212 PR OFFICE/OUTPT VISIT, EST, LEVL II, 10-19 MIN: ICD-10-PCS | Mod: S$GLB,,, | Performed by: STUDENT IN AN ORGANIZED HEALTH CARE EDUCATION/TRAINING PROGRAM

## 2023-04-05 PROCEDURE — 88175 CYTOPATH C/V AUTO FLUID REDO: CPT | Performed by: STUDENT IN AN ORGANIZED HEALTH CARE EDUCATION/TRAINING PROGRAM

## 2023-04-05 RX ORDER — FAMOTIDINE 40 MG/1
40 TABLET, FILM COATED ORAL
COMMUNITY
Start: 2023-03-23 | End: 2024-03-22

## 2023-04-05 RX ORDER — AMLODIPINE BESYLATE 5 MG/1
5 TABLET ORAL
COMMUNITY
Start: 2023-03-23

## 2023-12-27 ENCOUNTER — TELEPHONE (OUTPATIENT)
Dept: INTERNAL MEDICINE | Facility: CLINIC | Age: 63
End: 2023-12-27
Payer: COMMERCIAL

## 2024-01-11 ENCOUNTER — OFFICE VISIT (OUTPATIENT)
Dept: PODIATRY | Facility: CLINIC | Age: 64
End: 2024-01-11
Payer: COMMERCIAL

## 2024-01-11 VITALS — HEIGHT: 66 IN | WEIGHT: 186.06 LBS | BODY MASS INDEX: 29.9 KG/M2

## 2024-01-11 DIAGNOSIS — M72.2 PLANTAR FASCIITIS: ICD-10-CM

## 2024-01-11 DIAGNOSIS — M79.672 LEFT FOOT PAIN: Primary | ICD-10-CM

## 2024-01-11 PROCEDURE — 99203 OFFICE O/P NEW LOW 30 MIN: CPT | Mod: S$GLB,,, | Performed by: PODIATRIST

## 2024-01-11 PROCEDURE — 3008F BODY MASS INDEX DOCD: CPT | Mod: CPTII,S$GLB,, | Performed by: PODIATRIST

## 2024-01-11 PROCEDURE — 1159F MED LIST DOCD IN RCRD: CPT | Mod: CPTII,S$GLB,, | Performed by: PODIATRIST

## 2024-01-11 PROCEDURE — 99999 PR PBB SHADOW E&M-EST. PATIENT-LVL III: CPT | Mod: PBBFAC,,, | Performed by: PODIATRIST

## 2024-01-11 RX ORDER — MELOXICAM 15 MG/1
15 TABLET ORAL DAILY
Qty: 20 TABLET | Refills: 0 | Status: SHIPPED | OUTPATIENT
Start: 2024-01-11

## 2024-01-11 RX ORDER — DICLOFENAC SODIUM 10 MG/G
2 GEL TOPICAL DAILY
Qty: 100 G | Refills: 3 | Status: SHIPPED | OUTPATIENT
Start: 2024-01-11

## 2024-01-11 NOTE — PROGRESS NOTES
Subjective:     Patient ID: Akiko Esparza is a 63 y.o. female.    Chief Complaint: Foot Pain (Left arch)    Akiko is a 63 y.o. female who presents to the podiatry clinic  with complaint of  left foot pain. Onset of the symptoms was several weeks ago. Precipitating event: none known. Current symptoms include: ability to bear weight, but with some pain. Aggravating factors: any weight bearing. Symptoms have progressed to a point and plateaued. Patient has had no prior foot problems. Evaluation to date: none. Treatment to date: none. Patients rates pain 4/10 on pain scale.    Review of Systems   Constitutional: Negative for chills.   Cardiovascular:  Negative for chest pain and claudication.   Respiratory:  Negative for cough.    Skin:  Positive for color change, dry skin and nail changes.   Musculoskeletal:  Positive for joint pain.   Gastrointestinal:  Negative for nausea.   Neurological:  Positive for paresthesias. Negative for numbness.   Psychiatric/Behavioral:  The patient is not nervous/anxious.         Objective:     Physical Exam  Constitutional:       Appearance: She is well-developed.      Comments: Oriented to time, place, and person.   Cardiovascular:      Comments: DP and PT pulses are palpable bilaterally. 3 sec capillary refill time and toes and feet are warm to touch proximally .  There is  hair growth on the feet and toes b/l. There is no edema b/l. No spider veins or varicosities present b/l.     Musculoskeletal:      Comments: Equinus noted b/l ankles with < 10 deg DF noted. MMT 5/5 in DF/PF/Inv/Ev resistance with no reproduction of pain in any direction. Passive range of motion of ankle and pedal joints is painless b/l.    Pain on palpation plantar medial left heel, no pain with ROM or MMT or medial and lateral compression of heel, - tinel's sign     Feet:      Right foot:      Skin integrity: No callus or dry skin.      Left foot:      Skin integrity: No callus or dry skin.   Lymphadenopathy:       Comments: Negative lymphadenopathy bilateral popliteal fossa and tarsal tunnel.   Skin:     Comments: No open lesions, lacerations or wounds noted.Interdigital spaces clean, dry and intact b/l. No erythema noted to b/l foot.  Nails normal color and trophic qualities.     Neurological:      Mental Status: She is alert.      Comments: Light touch, proprioception, and sharp/dull sensation are all intact bilaterally. Protective threshold with the [place] monofilament is intact bilaterally.    Psychiatric:         Behavior: Behavior is cooperative.           Assessment:      Encounter Diagnoses   Name Primary?    Left foot pain Yes    Plantar fasciitis      Plan:     Akiko was seen today for foot pain.    Diagnoses and all orders for this visit:    Left foot pain  -     diclofenac sodium (VOLTAREN) 1 % Gel; Apply 2 g topically once daily.  -     meloxicam (MOBIC) 15 MG tablet; Take 1 tablet (15 mg total) by mouth once daily.    Plantar fasciitis      I counseled the patient on her conditions, their implications and medical management.      Xray left foot ordered    Rx Voltaren gel to be applied to affected area up to 3-4 x daily as needed for pain    Discussed different treatment options for heel pain. including conservative and interventional.  I gave written and verbal instructions on heel cord stretching and this was demonstrated for the patient. Patient expressed understanding. Discussed wearing appropriate shoe gear and avoiding flats, slippers, sandals, barefoot, and sockfeet. Recommended arch supports. My recommendation for OTC supports is [employer], [employer]ICS tennis shoes.       Patient instructed on adequate icing techniques. Patient should ice the affected area at least once per day x 10 minutes for 10 days . I advised the  patient that extra icing would also be beneficial to ensure adequate anti inflammatory effect     Stretching handout dispensed to patient. Instructions on adequate  stretching reviewed in clinic        RTC PRN

## 2024-03-04 ENCOUNTER — PATIENT MESSAGE (OUTPATIENT)
Dept: FAMILY MEDICINE | Facility: CLINIC | Age: 64
End: 2024-03-04

## 2024-03-04 ENCOUNTER — OFFICE VISIT (OUTPATIENT)
Dept: FAMILY MEDICINE | Facility: CLINIC | Age: 64
End: 2024-03-04
Payer: COMMERCIAL

## 2024-03-04 VITALS
HEIGHT: 66 IN | OXYGEN SATURATION: 97 % | SYSTOLIC BLOOD PRESSURE: 140 MMHG | WEIGHT: 193.13 LBS | DIASTOLIC BLOOD PRESSURE: 92 MMHG | BODY MASS INDEX: 31.04 KG/M2 | TEMPERATURE: 99 F | HEART RATE: 76 BPM

## 2024-03-04 DIAGNOSIS — U07.1 COVID-19 VIRUS DETECTED: ICD-10-CM

## 2024-03-04 DIAGNOSIS — J01.90 ACUTE NON-RECURRENT SINUSITIS, UNSPECIFIED LOCATION: Primary | ICD-10-CM

## 2024-03-04 DIAGNOSIS — U07.1 COVID: Primary | ICD-10-CM

## 2024-03-04 DIAGNOSIS — I10 ESSENTIAL HYPERTENSION: ICD-10-CM

## 2024-03-04 LAB — SARS-COV-2 RNA RESP QL NAA+PROBE: DETECTED

## 2024-03-04 PROCEDURE — 99999 PR PBB SHADOW E&M-EST. PATIENT-LVL III: CPT | Mod: PBBFAC,,, | Performed by: FAMILY MEDICINE

## 2024-03-04 PROCEDURE — 99213 OFFICE O/P EST LOW 20 MIN: CPT | Mod: S$GLB,,, | Performed by: FAMILY MEDICINE

## 2024-03-04 PROCEDURE — 3080F DIAST BP >= 90 MM HG: CPT | Mod: CPTII,S$GLB,, | Performed by: FAMILY MEDICINE

## 2024-03-04 PROCEDURE — 4010F ACE/ARB THERAPY RXD/TAKEN: CPT | Mod: CPTII,S$GLB,, | Performed by: FAMILY MEDICINE

## 2024-03-04 PROCEDURE — 3008F BODY MASS INDEX DOCD: CPT | Mod: CPTII,S$GLB,, | Performed by: FAMILY MEDICINE

## 2024-03-04 PROCEDURE — 3077F SYST BP >= 140 MM HG: CPT | Mod: CPTII,S$GLB,, | Performed by: FAMILY MEDICINE

## 2024-03-04 PROCEDURE — 87635 SARS-COV-2 COVID-19 AMP PRB: CPT | Performed by: FAMILY MEDICINE

## 2024-03-04 PROCEDURE — 1159F MED LIST DOCD IN RCRD: CPT | Mod: CPTII,S$GLB,, | Performed by: FAMILY MEDICINE

## 2024-03-04 PROCEDURE — 1160F RVW MEDS BY RX/DR IN RCRD: CPT | Mod: CPTII,S$GLB,, | Performed by: FAMILY MEDICINE

## 2024-03-04 RX ORDER — BENZONATATE 200 MG/1
200 CAPSULE ORAL 3 TIMES DAILY PRN
Qty: 30 CAPSULE | Refills: 1 | Status: SHIPPED | OUTPATIENT
Start: 2024-03-04

## 2024-03-04 RX ORDER — LEVOCETIRIZINE DIHYDROCHLORIDE 5 MG/1
5 TABLET, FILM COATED ORAL NIGHTLY
Qty: 30 TABLET | Refills: 11 | Status: SHIPPED | OUTPATIENT
Start: 2024-03-04 | End: 2025-03-04

## 2024-03-04 RX ORDER — PROMETHAZINE HYDROCHLORIDE AND DEXTROMETHORPHAN HYDROBROMIDE 6.25; 15 MG/5ML; MG/5ML
SYRUP ORAL
Qty: 240 ML | Refills: 0 | Status: SHIPPED | OUTPATIENT
Start: 2024-03-04

## 2024-03-04 NOTE — LETTER
March 4, 2024      [facility]  [address] [place]  [place] [address]  Phone: [phone]  Fax: [phone]       Patient: Akiko Esparza   YOB: 1960  Date of Visit: 03/04/2024    To Whom It May Concern:    Aneta Esparza  was at [facility] on 03/04/2024. Please excuse her from work today for an illness. If you have any questions or concerns, or if I can be of further assistance, please do not hesitate to contact me.    Sincerely,    Margareth Kelly, DO

## 2024-03-04 NOTE — PROGRESS NOTES
Assessment & Plan:    Acute non-recurrent sinusitis, unspecified location  -     promethazine-dextromethorphan (PROMETHAZINE-DM) 6.25-15 mg/5 mL Syrp; Take 10-15ml by mouth every 8 hours as needed for coughing  Dispense: 240 mL; Refill: 0  -     benzonatate (TESSALON) 200 MG capsule; Take 1 capsule (200 mg total) by mouth 3 (three) times daily as needed for Cough.  Dispense: 30 capsule; Refill: 1  -     levocetirizine (XYZAL) 5 MG tablet; Take 1 tablet (5 mg total) by mouth every evening.  Dispense: 30 tablet; Refill: 11  -     COVID-19 Routine Screening; Future    Essential hypertension      Etiology most likely viral.  COVID test performed.  Encouraged rest, continue symptomatic care with nasal decongestants and cough suppressants prn. May try an oral antihistamine. Caution with using medications with pseudoephedrine because of history of HTN.  Work excuse provided.     BP elevated today because patient has not taken her medication yet.     Follow-up: Follow up if symptoms worsen or fail to improve.  ______________________________________________________________________    Chief Complaint  Chief Complaint   Patient presents with    Nasal Congestion    Cough       HPI  Akiko Esparza is a 63 y.o. female with medical diagnoses as listed in the medical history and problem list that presents to the office for sinus congestion and a cough.     Sinus Problem  This is a new problem. Episode onset: Thursday. The problem has been waxing and waning since onset. There has been no fever. Associated symptoms include chills (at the onset), congestion, coughing, ear pain (at the onset), headaches, sinus pressure and a sore throat (at the onset). Pertinent negatives include no shortness of breath or sneezing. Treatments tried: Tylenol, Mucinex, Afrin, Nyquil. Improvement on treatment: mild, temporary relief.         PAST MEDICAL HISTORY:  Past Medical History:   Diagnosis Date    GERD (gastroesophageal reflux disease)      Hypertension        PAST SURGICAL HISTORY:  Past Surgical History:   Procedure Laterality Date     SECTION       &        SOCIAL HISTORY:  Social History     Socioeconomic History    Marital status: Single    Number of children: 2   Occupational History    Occupation:      Comment:    Tobacco Use    Smoking status: Never    Smokeless tobacco: Never   Substance and Sexual Activity    Alcohol use: Not Currently     Alcohol/week: 0.8 standard drinks of alcohol     Types: 1 Standard drinks or equivalent per week    Drug use: No    Sexual activity: Not Currently     Partners: Male     Birth control/protection: Post-menopausal       FAMILY HISTORY:  Family History   Problem Relation Age of Onset    Arthritis Mother     Diabetes Mother     Hypertension Mother     Vision loss Mother         wears glasses    Arthritis Father     Diabetes Father         borderline-DrDain's are monitoring    Hypertension Father     Vision loss Father         wears glasses    Arthritis Sister     Asthma Sister     Hypertension Sister         borderline    Vision loss Sister         wears glasses    Vision loss Daughter         wears glasses    Stroke Paternal Grandfather     Breast cancer Neg Hx     Colon cancer Neg Hx     Ovarian cancer Neg Hx        ALLERGIES AND MEDICATIONS: updated and reviewed.  Review of patient's allergies indicates:  No Known Allergies  Current Outpatient Medications   Medication Sig Dispense Refill    amLODIPine (NORVASC) 5 MG tablet Take 5 mg by mouth.      olmesartan (BENICAR) 20 MG tablet Take 20 mg by mouth.      benzonatate (TESSALON) 200 MG capsule Take 1 capsule (200 mg total) by mouth 3 (three) times daily as needed for Cough. 30 capsule 1    diclofenac sodium (VOLTAREN) 1 % Gel Apply 2 g topically once daily. (Patient not taking: Reported on 3/4/2024) 100 g 3    estradioL (ESTRACE) 0.01 % (0.1 mg/gram) vaginal cream Place 0.5 g vaginally every evening. Can use the  "applicator provided, or use pea sized amount on finger, apply vaginally every night before bed, for 2 weeks. After that, use only 2-3 times per week at night before bed. (Patient not taking: Reported on 3/4/2024) 42.5 g 1    famotidine (PEPCID) 40 MG tablet Take 40 mg by mouth.      levocetirizine (XYZAL) 5 MG tablet Take 1 tablet (5 mg total) by mouth every evening. 30 tablet 11    meloxicam (MOBIC) 15 MG tablet Take 1 tablet (15 mg total) by mouth once daily. (Patient not taking: Reported on 3/4/2024) 20 tablet 0    promethazine-dextromethorphan (PROMETHAZINE-DM) 6.25-15 mg/5 mL Syrp Take 10-15ml by mouth every 8 hours as needed for coughing 240 mL 0     No current facility-administered medications for this visit.         ROS  Review of Systems   Constitutional:  Positive for chills (at the onset). Negative for activity change, appetite change and fever.   HENT:  Positive for congestion, ear pain (at the onset), sinus pressure, sinus pain and sore throat (at the onset). Negative for postnasal drip, rhinorrhea, sneezing and voice change.    Eyes:  Negative for discharge, redness and itching.   Respiratory:  Positive for cough. Negative for shortness of breath and wheezing.    Musculoskeletal:  Positive for myalgias (at the onset).   Skin:  Negative for color change and rash.   Neurological:  Positive for headaches. Negative for dizziness.   Hematological:  Negative for adenopathy.           Physical Exam  Vitals:    03/04/24 0830   BP: (!) 140/92   Pulse: 76   Temp: 99.4 °F (37.4 °C)   TempSrc: Oral   SpO2: 97%   Weight: 87.6 kg (193 lb 2 oz)   Height: 5' 6" (1.676 m)    Body mass index is 31.17 kg/m².  Weight: 87.6 kg (193 lb 2 oz)   Height: 5' 6" (167.6 cm)   Physical Exam  Constitutional:       General: She is not in acute distress.     Appearance: She is obese.   HENT:      Head: Normocephalic and atraumatic.      Right Ear: Tympanic membrane and ear canal normal.      Left Ear: Tympanic membrane and ear canal " normal.      Nose: Nose normal.      Mouth/Throat:      Mouth: Mucous membranes are moist.      Pharynx: Oropharynx is clear.   Eyes:      Conjunctiva/sclera: Conjunctivae normal.      Pupils: Pupils are equal, round, and reactive to light.   Neck:      Thyroid: No thyromegaly.      Vascular: No carotid bruit.   Cardiovascular:      Rate and Rhythm: Normal rate and regular rhythm.      Pulses: Normal pulses.      Heart sounds: Normal heart sounds.   Pulmonary:      Effort: Pulmonary effort is normal. No respiratory distress.      Breath sounds: Normal breath sounds.      Comments: Cough    Musculoskeletal:      Cervical back: Neck supple.      Right lower leg: No edema.      Left lower leg: No edema.   Lymphadenopathy:      Cervical: Cervical adenopathy present.      Right cervical: Superficial cervical adenopathy present.      Left cervical: Superficial cervical adenopathy present.   Skin:     General: Skin is warm and dry.      Findings: No rash.   Neurological:      General: No focal deficit present.      Mental Status: She is alert and oriented to person, place, and time.   Psychiatric:         Mood and Affect: Mood normal.         Behavior: Behavior normal.         Thought Content: Thought content normal.

## 2024-03-09 DIAGNOSIS — N95.8 GENITOURINARY SYNDROME OF MENOPAUSE: ICD-10-CM

## 2024-03-13 RX ORDER — ESTRADIOL 0.1 MG/G
0.5 CREAM VAGINAL NIGHTLY
Qty: 42.5 G | Refills: 0 | Status: SHIPPED | OUTPATIENT
Start: 2024-03-13

## 2024-04-19 ENCOUNTER — HOSPITAL ENCOUNTER (EMERGENCY)
Facility: HOSPITAL | Age: 64
Discharge: HOME | End: 2024-04-19
Attending: INTERNAL MEDICINE
Payer: COMMERCIAL

## 2024-04-19 VITALS
DIASTOLIC BLOOD PRESSURE: 100 MMHG | WEIGHT: 185 LBS | SYSTOLIC BLOOD PRESSURE: 198 MMHG | OXYGEN SATURATION: 98 % | HEART RATE: 60 BPM | BODY MASS INDEX: 29.86 KG/M2 | TEMPERATURE: 98 F | RESPIRATION RATE: 20 BRPM

## 2024-04-19 DIAGNOSIS — J06.9 ACUTE URI: ICD-10-CM

## 2024-04-19 DIAGNOSIS — I10 HYPERTENSION, UNSPECIFIED TYPE: Primary | ICD-10-CM

## 2024-04-19 LAB
CTP QC/QA: YES
INFLUENZA A ANTIGEN, POC: NEGATIVE
INFLUENZA B ANTIGEN, POC: NEGATIVE
SARS-COV-2 RDRP RESP QL NAA+PROBE: NEGATIVE

## 2024-04-19 PROCEDURE — 99283 EMERGENCY DEPT VISIT LOW MDM: CPT | Mod: ER

## 2024-04-19 PROCEDURE — 25000003 PHARM REV CODE 250: Mod: ER | Performed by: INTERNAL MEDICINE

## 2024-04-19 PROCEDURE — 87804 INFLUENZA ASSAY W/OPTIC: CPT | Mod: ER

## 2024-04-19 PROCEDURE — 87635 SARS-COV-2 COVID-19 AMP PRB: CPT | Mod: ER | Performed by: INTERNAL MEDICINE

## 2024-04-19 RX ORDER — AMLODIPINE BESYLATE 5 MG/1
5 TABLET ORAL
Status: COMPLETED | OUTPATIENT
Start: 2024-04-19 | End: 2024-04-19

## 2024-04-19 RX ORDER — ONDANSETRON HYDROCHLORIDE 2 MG/ML
4 INJECTION, SOLUTION INTRAVENOUS
Status: DISCONTINUED | OUTPATIENT
Start: 2024-04-19 | End: 2024-04-19

## 2024-04-19 RX ORDER — AZELASTINE 1 MG/ML
2 SPRAY, METERED NASAL 2 TIMES DAILY
Qty: 30 ML | Refills: 0 | Status: SHIPPED | OUTPATIENT
Start: 2024-04-19 | End: 2024-06-13

## 2024-04-19 RX ORDER — FLUTICASONE PROPIONATE 50 MCG
2 SPRAY, SUSPENSION (ML) NASAL DAILY
Qty: 15 G | Refills: 0 | Status: SHIPPED | OUTPATIENT
Start: 2024-04-19

## 2024-04-19 RX ORDER — LOSARTAN POTASSIUM 25 MG/1
50 TABLET ORAL
Status: COMPLETED | OUTPATIENT
Start: 2024-04-19 | End: 2024-04-19

## 2024-04-19 RX ADMIN — LOSARTAN POTASSIUM 50 MG: 25 TABLET, FILM COATED ORAL at 06:04

## 2024-04-19 RX ADMIN — AMLODIPINE BESYLATE 5 MG: 5 TABLET ORAL at 06:04

## 2024-04-19 NOTE — ED PROVIDER NOTES
Encounter Date: 2024       History     Chief Complaint   Patient presents with    Hypertension     Pt sent over from urgent care due to having high bp   Also has cough and sore throat   Pt has been taking muccinex      63-year-old female with a past medical history significant for GERD and hypertension presents to the emergency department complaining of elevated blood pressure, cough and sore throat x1 day.  She initially presented to urgent care and was told to come to the emergency department for evaluation secondary to elevated blood pressure.    The history is provided by the patient. No  was used.     Review of patient's allergies indicates:  No Known Allergies  Past Medical History:   Diagnosis Date    GERD (gastroesophageal reflux disease)     Hypertension      Past Surgical History:   Procedure Laterality Date     SECTION       &      Family History   Problem Relation Name Age of Onset    Arthritis Mother Neumann     Diabetes Mother Neumann     Hypertension Mother Neumann     Vision loss Mother Neumann         wears glasses    Arthritis Father Ulyseses     Diabetes Father Ulyseses         borderline-Dr.'s are monitoring    Hypertension Father Ulyseses     Vision loss Father Ulyseses         wears glasses    Arthritis Sister Bisi     Asthma Sister Bisi     Hypertension Sister Bisi         borderline    Vision loss Sister Bisi         wears glasses    Vision loss Daughter Devain         wears glasses    Stroke Paternal Grandfather Pete     Breast cancer Neg Hx      Colon cancer Neg Hx      Ovarian cancer Neg Hx       Social History     Tobacco Use    Smoking status: Never    Smokeless tobacco: Never   Substance Use Topics    Alcohol use: Not Currently     Alcohol/week: 0.8 standard drinks of alcohol     Types: 1 Standard drinks or equivalent per week    Drug use: No     Review of Systems   Constitutional:  Negative for chills and fever.   HENT:  Positive for congestion and  postnasal drip.    Respiratory:  Positive for cough. Negative for shortness of breath.    Cardiovascular:  Negative for chest pain.   Gastrointestinal:  Negative for nausea and vomiting.   All other systems reviewed and are negative.      Physical Exam     Initial Vitals [04/19/24 1701]   BP Pulse Resp Temp SpO2   (!) 200/138 68 20 97.9 °F (36.6 °C) 100 %      MAP       --         Physical Exam    Nursing note and vitals reviewed.  Constitutional: She is not diaphoretic. No distress.   HENT:   Head: Normocephalic and atraumatic.   Right Ear: External ear normal.   Left Ear: External ear normal.   Clear nasal discharge, clear postnasal drip, posterior oropharyngeal erythema without exudate or edema   Eyes: Conjunctivae are normal.   Neck: Neck supple.   Normal range of motion.  Cardiovascular:  Normal rate and regular rhythm.           Pulmonary/Chest: Breath sounds normal. No respiratory distress.   Abdominal: Abdomen is soft. Bowel sounds are normal. There is no abdominal tenderness.   Musculoskeletal:         General: Normal range of motion.      Cervical back: Normal range of motion and neck supple.     Neurological: She is alert. She has normal strength.   Skin: Skin is warm and dry. Capillary refill takes less than 2 seconds.   Psychiatric: She has a normal mood and affect. Thought content normal.         ED Course   Procedures  Labs Reviewed   SARS-COV-2 RDRP GENE    Narrative:     This test utilizes isothermal nucleic acid amplification technology to detect the SARS-CoV-2 RdRp nucleic acid segment. The analytical sensitivity (limit of detection) is 500 copies/swab.     A POSITIVE result is indicative of the presence of SARS-CoV-2 RNA; clinical correlation with patient history and other diagnostic information is necessary to determine patient infection status.    A NEGATIVE result means that SARS-CoV-2 nucleic acids are not present above the limit of detection. A NEGATIVE result should be treated as  presumptive. It does not rule out the possibility of COVID-19 and should not be the sole basis for treatment decisions. If COVID-19 is strongly suspected based on clinical and exposure history, re-testing using an alternate molecular assay should be considered.     Commercial kits are provided by [employer].   _________________________________________________________________   The authorized Fact Sheet for Healthcare Providers and the authorized Fact Sheet for Patients of the ID NOW COVID-19 are available on the FDA website:    https://www.fda.gov/media/575731/download      https://www.fda.gov/media/307989/download       POCT RAPID INFLUENZA A/B          Imaging Results    None          Medications   amLODIPine tablet 5 mg (5 mg Oral Given 4/19/24 1834)   losartan tablet 50 mg (50 mg Oral Given 4/19/24 1834)     Medical Decision Making  63-year-old female with a past medical history significant for GERD and hypertension presents to the emergency department complaining of elevated blood pressure, cough and sore throat x1 day.  She initially presented to urgent care and was told to come to the emergency department for evaluation secondary to elevated blood pressure.  Course of ED stay:   Amlodipine/losartan were given for elevated BP.  COVID and flu screens were negative.  Blood pressure improved after medication.  Instructions for viral URI were given as well as prescriptions for Astelin/fluticasone.  Patient was advised to follow-up with her primary care physician within the next week for re-evaluation/return to the emergency department if condition worsens.    Amount and/or Complexity of Data Reviewed  Labs: ordered.    Risk  Prescription drug management.                                      Clinical Impression:  Final diagnoses:  [I10] Hypertension, unspecified type (Primary)  [J06.9] Acute URI          ED Disposition Condition    Discharge Stable          ED Prescriptions       Medication Sig Dispense  Start Date End Date Auth. Provider    azelastine (ASTELIN) 137 mcg (0.1 %) nasal spray 2 sprays (274 mcg total) by Nasal route 2 (two) times daily. 30 mL 4/19/2024 6/13/2024 Jaleel Irvin MD    fluticasone propionate (FLONASE) 50 mcg/actuation nasal spray 2 sprays (100 mcg total) by Each Nostril route once daily. 15 g 4/19/2024 -- Jaleel Irvin MD          Follow-up Information    None          Jaleel Irvin MD  04/20/24 0084

## 2024-10-29 ENCOUNTER — PATIENT MESSAGE (OUTPATIENT)
Dept: PODIATRY | Facility: CLINIC | Age: 64
End: 2024-10-29
Payer: COMMERCIAL

## 2025-02-28 ENCOUNTER — OFFICE VISIT (OUTPATIENT)
Dept: OBSTETRICS AND GYNECOLOGY | Facility: CLINIC | Age: 65
End: 2025-02-28
Payer: COMMERCIAL

## 2025-02-28 VITALS
SYSTOLIC BLOOD PRESSURE: 161 MMHG | DIASTOLIC BLOOD PRESSURE: 103 MMHG | BODY MASS INDEX: 31.22 KG/M2 | HEART RATE: 89 BPM | WEIGHT: 193.38 LBS

## 2025-02-28 DIAGNOSIS — Z01.419 WELL WOMAN EXAM WITH ROUTINE GYNECOLOGICAL EXAM: ICD-10-CM

## 2025-02-28 DIAGNOSIS — N94.9 VAGINAL DISCOMFORT: ICD-10-CM

## 2025-02-28 DIAGNOSIS — Z12.31 ENCOUNTER FOR SCREENING MAMMOGRAM FOR BREAST CANCER: Primary | ICD-10-CM

## 2025-02-28 PROCEDURE — 99999 PR PBB SHADOW E&M-EST. PATIENT-LVL III: CPT | Mod: PBBFAC,,, | Performed by: OBSTETRICS & GYNECOLOGY

## 2025-02-28 PROCEDURE — 81515 NFCT DS BV&VAGINITIS DNA ALG: CPT | Performed by: OBSTETRICS & GYNECOLOGY

## 2025-02-28 RX ORDER — TIRZEPATIDE 2.5 MG/.5ML
2.5 INJECTION, SOLUTION SUBCUTANEOUS
COMMUNITY
Start: 2025-02-11

## 2025-02-28 NOTE — PROGRESS NOTES
CC: Well woman exam    Akiko Esparza is a 64 y.o. female  presents for well woman exam.  Patient is postmenopausal.  LMP was at age 44.  Patient  states that inner thighs feel like they are burning and irritated.  This started last night.  She also reports vaginal discomfort.  Denies vaginal discharge, itching or odor.  She denies any changes in soaps, detergents.  Denies chronic use of panty liners.  She sometimes wears cotton underwear.   OB History          2    Para   2    Term   2            AB        Living   2         SAB        IAB        Ectopic        Multiple        Live Births   2               Gynecology   Past Medical History:   Diagnosis Date    GERD (gastroesophageal reflux disease)     Hypertension      Past Surgical History:   Procedure Laterality Date     SECTION       &      Social History[1]  Family History   Problem Relation Name Age of Onset    Arthritis Mother Neumann     Diabetes Mother Neumann     Hypertension Mother Neumann     Vision loss Mother Neumann         wears glasses    Arthritis Father Ulyseses     Diabetes Father Ulyseses         borderline-Dr.'s are monitoring    Hypertension Father Ulyseses     Vision loss Father Ulyseses         wears glasses    Arthritis Sister Bisi     Asthma Sister Bisi     Hypertension Sister Bisi         borderline    Vision loss Sister Bisi         wears glasses    Vision loss Daughter Devain         wears glasses    Stroke Paternal Grandfather Pete     Breast cancer Neg Hx      Colon cancer Neg Hx      Ovarian cancer Neg Hx           BP (!) 161/103 (Patient Position: Sitting)   Pulse 89   Wt 87.7 kg (193 lb 6.4 oz)   LMP  (LMP Unknown)   BMI 31.22 kg/m²       ROS  Review of Systems   Constitutional: Negative.    Gastrointestinal: Negative.    Genitourinary: Negative.    Psychiatric/Behavioral: Negative.            PHYSICAL EXAM:  Physical Exam  Vitals reviewed.   Constitutional:       Appearance: Normal appearance. She  is well-developed.   HENT:      Head: Normocephalic.   Chest:   Breasts:     Breasts are symmetrical.      Right: No inverted nipple, mass, nipple discharge, skin change or tenderness.      Left: No inverted nipple, mass, nipple discharge, skin change or tenderness.   Abdominal:      General: Abdomen is flat.      Palpations: Abdomen is soft.      Tenderness: There is no abdominal tenderness. There is no guarding or rebound.   Genitourinary:     General: Normal vulva.      Exam position: Lithotomy position.      Labia:         Right: No rash, tenderness or lesion.         Left: No rash, tenderness or lesion.       Vagina: Normal.      Cervix: Normal. No discharge.      Uterus: Normal. Not tender.       Adnexa: Right adnexa normal and left adnexa normal.      Comments: No erythema, irritation or rash seen on inner thighs or vulva  Lymphadenopathy:      Upper Body:      Right upper body: No axillary adenopathy.      Left upper body: No axillary adenopathy.   Neurological:      Mental Status: She is alert.   Psychiatric:         Behavior: Behavior is cooperative.            Encounter for screening mammogram for breast cancer  -     Mammo Digital Screening Bilat; Future; Expected date: 02/28/2025    Vaginal discomfort  -     Vaginosis Screen by DNA Probe    Well woman exam with routine gynecological exam      Pap not indicated today  Pap 3/2024 WNL HPV negative  SBE education completed  Thigh irritation: recommend loose clothing and thin layer of Vaseline to prevent friction  Pelvic exam WNL, however, vaginal swab was sent  RTO 1 year or prn      Patient was counseled today on A.C.S. Pap guidelines and recommendations for yearly pelvic exams, mammograms and monthly self breast exams; to see her PCP for other health maintenance.              [1]   Social History  Socioeconomic History    Marital status: Single    Number of children: 2   Occupational History    Occupation:      Comment:     Tobacco Use    Smoking status: Never    Smokeless tobacco: Never   Substance and Sexual Activity    Alcohol use: Not Currently     Alcohol/week: 0.8 standard drinks of alcohol     Types: 1 Standard drinks or equivalent per week    Drug use: No    Sexual activity: Not Currently     Partners: Male     Birth control/protection: Post-menopausal

## 2025-03-05 ENCOUNTER — RESULTS FOLLOW-UP (OUTPATIENT)
Dept: OBSTETRICS AND GYNECOLOGY | Facility: CLINIC | Age: 65
End: 2025-03-05

## 2025-03-05 DIAGNOSIS — A59.9 TRICHIMONIASIS: ICD-10-CM

## 2025-03-05 DIAGNOSIS — B96.89 BACTERIAL VAGINOSIS: Primary | ICD-10-CM

## 2025-03-05 DIAGNOSIS — N76.0 BACTERIAL VAGINOSIS: Primary | ICD-10-CM

## 2025-03-05 RX ORDER — METRONIDAZOLE 500 MG/1
500 TABLET ORAL 2 TIMES DAILY WITH MEALS
Qty: 14 TABLET | Refills: 0 | Status: SHIPPED | OUTPATIENT
Start: 2025-03-05 | End: 2025-03-12

## 2025-04-21 ENCOUNTER — OFFICE VISIT (OUTPATIENT)
Dept: FAMILY MEDICINE | Facility: CLINIC | Age: 65
End: 2025-04-21
Payer: COMMERCIAL

## 2025-04-21 VITALS
OXYGEN SATURATION: 97 % | WEIGHT: 193.13 LBS | DIASTOLIC BLOOD PRESSURE: 100 MMHG | HEART RATE: 67 BPM | SYSTOLIC BLOOD PRESSURE: 164 MMHG | RESPIRATION RATE: 16 BRPM | TEMPERATURE: 98 F | HEIGHT: 66 IN | BODY MASS INDEX: 31.04 KG/M2

## 2025-04-21 DIAGNOSIS — J34.9 SINUS PROBLEM: Primary | ICD-10-CM

## 2025-04-21 DIAGNOSIS — Z12.31 ENCOUNTER FOR SCREENING MAMMOGRAM FOR BREAST CANCER: ICD-10-CM

## 2025-04-21 DIAGNOSIS — I10 PRIMARY HYPERTENSION: ICD-10-CM

## 2025-04-21 DIAGNOSIS — R05.9 COUGH, UNSPECIFIED TYPE: ICD-10-CM

## 2025-04-21 DIAGNOSIS — J01.90 ACUTE NON-RECURRENT SINUSITIS, UNSPECIFIED LOCATION: ICD-10-CM

## 2025-04-21 DIAGNOSIS — R09.81 NASAL CONGESTION: ICD-10-CM

## 2025-04-21 DIAGNOSIS — H10.9 CONJUNCTIVITIS, UNSPECIFIED CONJUNCTIVITIS TYPE, UNSPECIFIED LATERALITY: ICD-10-CM

## 2025-04-21 DIAGNOSIS — Z12.12 ENCOUNTER FOR COLORECTAL CANCER SCREENING: ICD-10-CM

## 2025-04-21 DIAGNOSIS — Z12.11 ENCOUNTER FOR COLORECTAL CANCER SCREENING: ICD-10-CM

## 2025-04-21 DIAGNOSIS — J06.9 ACUTE URI: ICD-10-CM

## 2025-04-21 PROCEDURE — 1159F MED LIST DOCD IN RCRD: CPT | Mod: CPTII,S$GLB,,

## 2025-04-21 PROCEDURE — 96372 THER/PROPH/DIAG INJ SC/IM: CPT | Mod: S$GLB,,,

## 2025-04-21 PROCEDURE — 99999 PR PBB SHADOW E&M-EST. PATIENT-LVL IV: CPT | Mod: PBBFAC,,,

## 2025-04-21 PROCEDURE — 99214 OFFICE O/P EST MOD 30 MIN: CPT | Mod: 25,S$GLB,,

## 2025-04-21 PROCEDURE — 3008F BODY MASS INDEX DOCD: CPT | Mod: CPTII,S$GLB,,

## 2025-04-21 PROCEDURE — 4010F ACE/ARB THERAPY RXD/TAKEN: CPT | Mod: CPTII,S$GLB,,

## 2025-04-21 PROCEDURE — 3080F DIAST BP >= 90 MM HG: CPT | Mod: CPTII,S$GLB,,

## 2025-04-21 PROCEDURE — 1160F RVW MEDS BY RX/DR IN RCRD: CPT | Mod: CPTII,S$GLB,,

## 2025-04-21 PROCEDURE — 3077F SYST BP >= 140 MM HG: CPT | Mod: CPTII,S$GLB,,

## 2025-04-21 RX ORDER — DEXAMETHASONE SODIUM PHOSPHATE 10 MG/ML
10 INJECTION INTRAMUSCULAR; INTRAVENOUS
Status: COMPLETED | OUTPATIENT
Start: 2025-04-21 | End: 2025-04-21

## 2025-04-21 RX ORDER — BENZONATATE 200 MG/1
200 CAPSULE ORAL 3 TIMES DAILY PRN
Qty: 30 CAPSULE | Refills: 1 | Status: SHIPPED | OUTPATIENT
Start: 2025-04-21

## 2025-04-21 RX ORDER — GENTAMICIN SULFATE 3 MG/ML
1 SOLUTION/ DROPS OPHTHALMIC EVERY 4 HOURS
Qty: 5 ML | Refills: 0 | Status: SHIPPED | OUTPATIENT
Start: 2025-04-21

## 2025-04-21 RX ADMIN — DEXAMETHASONE SODIUM PHOSPHATE 10 MG: 10 INJECTION INTRAMUSCULAR; INTRAVENOUS at 12:04

## 2025-04-21 NOTE — PROGRESS NOTES
Health Maintenance Due   Topic     Hepatitis C Screening  Consult with PCP    HIV Screening  Consult with PCP    TETANUS VACCINE      Hemoglobin A1c (Diabetic Prevention Screening)      Colorectal Cancer Screening  Consult with PCP    Shingles Vaccine (1 of 2) hx chickenpox ; inform pt can get vaccine at pharmacy.    Pneumococcal Vaccines (Age 50+) (1 of 1 - PCV)     Influenza Vaccine (1)     COVID-19 Vaccine (4 - 2024-25 season)     Mammogram

## 2025-04-21 NOTE — PROGRESS NOTES
Administered Dexamethasone 10 mg IM to right ventrogluteal.  Patient tolerated injection well, no adverse reactions noted.

## 2025-04-21 NOTE — PROGRESS NOTES
HPI     Chief Complaint:  Chief Complaint   Patient presents with    Conjunctivitis     Running eye    Sinus Problem    Cough    Nasal Congestion       Akiko Esparza is a 64 y.o. female with multiple medical diagnoses as listed in the medical history and problem list that presents for conjunctivitis, sinus problem, nasal congestion and cough    HPI    History of Present Illness    CHIEF COMPLAINT:  Akiko presents today for cough and sinus problems    HISTORY OF PRESENT ILLNESS:  She has experienced right-sided sinus congestion for approximately one week with sensitivity to touch but no noé pain. She describes feeling clogged in the affected area and experiences pressure without headache. Her right eye has been symptomatic for the past 3 days with a sensation of running/watering without actual discharge, and appears red. The left eye is unaffected. She has an intermittent dry cough that she describes as sounding asthmatic, though she has no known asthma diagnosis. She denies shortness of breath, fever, chills, and nausea.    ALLERGIES:  She has mild seasonal allergies throughout the year.    MEDICATIONS:  She takes Zyrtec intermittently with inconsistent usage due to misplacing medication.      ROS:  General: -fever, -chills, -fatigue, -weight gain, -weight loss  Eyes: -vision changes, +redness, -discharge, +eye watering  ENT: -ear pain, +nasal congestion, -sore throat  Cardiovascular: -chest pain, -palpitations, -lower extremity edema  Respiratory: +cough, -shortness of breath  Gastrointestinal: -abdominal pain, -nausea, -vomiting, -diarrhea, -constipation, -blood in stool  Genitourinary: -dysuria, -hematuria, -frequency  Musculoskeletal: -joint pain, -muscle pain  Skin: -rash, -lesion  Neurological: -headache, -dizziness, -numbness, -tingling  Psychiatric: -anxiety, -depression, -sleep difficulty  Head: +sinus pressure  Allergic: +seasonal allergies             Assessment & Plan     Assessment & Plan     Assessed cough, sinus congestion, and eye irritation as likely allergy-related with possible bacterial conjunctivitis.  Elevated BP potentially due to recent use of OTC decongestants.  Initiated combination of steroid injection, antibiotic eye drops, and cough suppressant to address multiple symptoms.        Problem List Items Addressed This Visit       Hypertension  FOLLOW-UP AND SCREENING:    - Follow up in a few weeks for BP recheck with nurse.  Re-evaluate her blood pressure medications  -patient is on amlodipine and Benicar..     Acute URI  ALLERGIC RHINITIS:  - Noted the patient's report of mild seasonal allergies.  - Considered allergies as a possible cause for the patient's symptoms.  - Advised to continue Zyrtec as needed for allergy symptom management.  - Provided guidance on proper use of OTC allergy medications.    Sinus problem - Primary    Relevant Medications    dexAMETHasone injection 10 mg (Completed)  ACUTE MAXILLARY SINUSITIS:  - Evaluated the patient's sinus problems and congestion persisting for approximately 1 week or longer.  - Noted sensitivity when touching the affected side.  - Assessed for congestion and pressure, confirming absence of fever.  - Offered treatment options of steroid pack or steroid injection.  - Administered steroid injection as per patient's preference.  - Prescribed saline nasal spray as needed for congestion.  - Explained potential rebound effects of prolonged use of OTC decongestants.  - Instructed the patient on proper usage of saline nasal spray.    Nasal congestion  dexAMETHasone injection 10 mg (Completed)  ACUTE MAXILLARY SINUSITIS:  - Evaluated the patient's sinus problems and congestion persisting for approximately 1 week or longer.  - Noted sensitivity when touching the affected side.  - Assessed for congestion and pressure, confirming absence of fever.  - Offered treatment options of steroid pack or steroid injection.  - Administered steroid injection as per  patient's preference.  - Prescribed saline nasal spray as needed for congestion.  - Explained potential rebound effects of prolonged use of OTC decongestants.  - Instructed the patient on proper usage of saline nasal spray.  OTORRHEA (RIGHT EAR) AND IMPACTED CERUMEN (LEFT EAR):  - Evaluated the patient's complaint of feeling of clogged ear.  - Performed otoscopic exam and noted potential fluid in the right ear.  - Performed otoscopic exam and observed cerumen impaction in the left ear.  - Administered steroid injection for symptom relief.    Cough    Relevant Medications    benzonatate (TESSALON) 200 MG capsule  COUGH:  - Evaluated the patient's intermittent cough without shortness of breath.  - Performed auscultation of the patient's lungs.  - Prescribed benzonatate (Tessalon) capsules for cough management.  - Explained potential rebound effects of prolonged use of OTC cough medicines.  - Instructed the patient on proper usage of benzonatate capsules.    Conjunctivitis    Relevant Medications    gentamicin (GARAMYCIN) 0.3 % ophthalmic solution  ACUTE CONJUNCTIVITIS (RIGHT EYE):  - Evaluated the patient's right eye issues persisting for approximately 3 days, including redness and sensation of water in the eye.  - Observed redness in the right eye during exam.  - Considered possibilities of bacterial, viral, or allergic conjunctivitis.  - Prescribed gentamicin eye drops for the right eye as a precautionary measure.  - Instructed the patient on proper eye drop usage and precautions.  - Discussed contagiousness of conjunctivitis and appropriate preventive measures.  - Emphasized importance of hand hygiene and thorough hand washing to prevent spread of eye infection.  - Advised the patient to be cautious with eye makeup use and discard current eye makeup to avoid reinfection.     Other Visit Diagnoses         Acute non-recurrent sinusitis, unspecified location        Relevant Medications    benzonatate (TESSALON) 200 MG  capsule    dexAMETHasone injection 10 mg (Completed)    OTORRHEA (RIGHT EAR) AND IMPACTED CERUMEN (LEFT EAR):  - Evaluated the patient's complaint of feeling of clogged ear.  - Performed otoscopic exam and noted potential fluid in the right ear.  - Performed otoscopic exam and observed cerumen impaction in the left ear.  - Administered steroid injection for symptom relief.      Encounter for screening mammogram for breast cancer        Relevant Orders    Mammo Digital Screening Bilat w/ Gustavo (XPD)  FOLLOW-UP AND SCREENING:  - Reviewed available screening options for colorectal cancer, including non-invasive Cologuard test.  - Ordered mammogram and Cologuard test for colorectal cancer screening.  - Follow up in a few weeks for BP recheck with nurse.  - Contact the office if symptoms do not improve or worsen.       Encounter for colorectal cancer screening        Relevant Orders    Cologuard Screening (Multitarget Stool DNA)  FOLLOW-UP AND SCREENING:  - Reviewed available screening options for colorectal cancer, including non-invasive Cologuard test.  - Ordered mammogram and Cologuard test for colorectal cancer screening.  - Follow up in a few weeks for BP recheck with nurse.  - Contact the office if symptoms do not improve or worsen.                 --------------------------------------------      Health Maintenance:  Health Maintenance         Date Due Completion Date    Hepatitis C Screening Never done ---    HIV Screening Never done ---    TETANUS VACCINE Never done ---    Hemoglobin A1c (Diabetic Prevention Screening) Never done ---    Colorectal Cancer Screening Never done ---    Shingles Vaccine (1 of 2) Never done ---    Pneumococcal Vaccines (Age 50+) (1 of 1 - PCV) Never done ---    RSV Vaccine (Age 60+ and Pregnant patients) (1 - Risk 60-74 years 1-dose series) Never done ---    Influenza Vaccine (1) 09/01/2024 10/15/2012    COVID-19 Vaccine (4 - 2024-25 season) 09/01/2024 1/12/2022    Mammogram 04/05/2025  "2023    Cervical Cancer Screening 03/15/2028 2023    Lipid Panel 2029            Health maintenance reviewed and Advised patient on the importance of completing overdue health maintenance items    Follow Up:  No follow-ups on file.    Exam     Review of Systems:  (as noted above)  Review of Systems    Physical Exam:   Physical Exam  Vitals:    25 1139   BP: (!) 164/100   BP Location: Right arm   Patient Position: Sitting   Pulse: 67   Resp: 16   Temp: 98 °F (36.7 °C)   TempSrc: Oral   SpO2: 97%   Weight: 87.6 kg (193 lb 2 oz)   Height: 5' 6" (1.676 m)      Body mass index is 31.17 kg/m².    Physical Exam    Vitals: Elevated blood pressure.  General: No acute distress. Well-developed. Well-nourished.  Eyes: EOMI. Sclerae anicteric.  HENT: Normocephalic. Atraumatic. Nares patent. Moist oral mucosa.  Ears: Right ear with possible fluid. Left ear with cerumen.  Cardiovascular: Regular rate. Regular rhythm. No murmurs. No rubs. No gallops. Normal S1, S2.  Respiratory: Normal respiratory effort. Clear to auscultation bilaterally. No rales. No rhonchi. No wheezing.  Abdomen: Soft. Non-tender. Non-distended. Normoactive bowel sounds.  Musculoskeletal: No  obvious deformity.  Extremities: No lower extremity edema.  Neurological: Alert & oriented x3. No slurred speech. Normal gait.  Psychiatric: Normal mood. Normal affect. Good insight. Good judgment.  Skin: Warm. Dry. No rash.           History     Past Medical History:  Past Medical History:   Diagnosis Date    GERD (gastroesophageal reflux disease)     Hypertension        Past Surgical History:  Past Surgical History:   Procedure Laterality Date     SECTION       &        Social History:  Social History[1]    Family History:  Family History   Problem Relation Name Age of Onset    Arthritis Mother Neumann     Diabetes Mother Neumann     Hypertension Mother Neumann     Vision loss Mother Nel         wears glasses    Arthritis Father Ulyseses  "    Diabetes Father Bam         borderline-'s are monitoring    Hypertension Father Ulyseses     Vision loss Father Bam         wears glasses    Arthritis Sister Bisi     Asthma Sister Bisi     Hypertension Sister Bisi         borderline    Vision loss Sister Bisi         wears glasses    Vision loss Daughter Kassie         wears glasses    Stroke Paternal Grandfather Pete     Breast cancer Neg Hx      Colon cancer Neg Hx      Ovarian cancer Neg Hx         Allergies and Medications: (updated and reviewed)  Review of patient's allergies indicates:  No Known Allergies  Current Medications[2]    Patient Care Team:  No, Primary Doctor as PCP - General         - The patient is given an After Visit Summary that lists all medications with directions, allergies, education, orders placed during this encounter and follow-up instructions.      - I have reviewed the patient's medical information including past medical, family, and social history sections including the medications and allergies.      - We discussed the patient's current medications.     This note was created by combination of typed  and MModal dictation.  Transcription errors may be present.  If there are any questions, please contact me.     This note was generated with the assistance of ambient listening technology. Verbal consent was obtained by the patient and accompanying visitor(s) for the recording of patient appointment to facilitate this note. I attest to having reviewed and edited the generated note for accuracy, though some syntax or spelling errors may persist. Please contact the author of this note for any clarification.          Eli Navarro PA-C                      [1]   Social History  Socioeconomic History    Marital status: Single    Number of children: 2   Occupational History    Occupation:      Comment:    Tobacco Use    Smoking status: Never    Smokeless tobacco:  Never   Substance and Sexual Activity    Alcohol use: Not Currently     Alcohol/week: 0.8 standard drinks of alcohol     Types: 1 Standard drinks or equivalent per week    Drug use: No    Sexual activity: Not Currently     Partners: Male     Birth control/protection: Post-menopausal   [2]   Current Outpatient Medications   Medication Sig Dispense Refill    amLODIPine (NORVASC) 5 MG tablet Take 5 mg by mouth.      azelastine (ASTELIN) 137 mcg (0.1 %) nasal spray 2 sprays (274 mcg total) by Nasal route 2 (two) times daily. 30 mL 0    famotidine (PEPCID) 40 MG tablet Take 40 mg by mouth.      fluticasone propionate (FLONASE) 50 mcg/actuation nasal spray 2 sprays (100 mcg total) by Each Nostril route once daily. 15 g 0    olmesartan (BENICAR) 20 MG tablet Take 20 mg by mouth.      tirzepatide, weight loss, (ZEPBOUND) 2.5 mg/0.5 mL PnIj Inject 2.5 mg into the skin.      benzonatate (TESSALON) 200 MG capsule Take 1 capsule (200 mg total) by mouth 3 (three) times daily as needed for Cough. 30 capsule 1    diclofenac sodium (VOLTAREN) 1 % Gel Apply 2 g topically once daily. (Patient not taking: Reported on 3/4/2024) 100 g 3    estradioL (ESTRACE) 0.01 % (0.1 mg/gram) vaginal cream Place 0.5 g vaginally every evening. Can use the applicator provided, or use pea sized amount on finger, apply vaginally at night 2-3 times per week at night before bed. (Patient not taking: Reported on 4/21/2025) 42.5 g 0    gentamicin (GARAMYCIN) 0.3 % ophthalmic solution Place 1 drop into the right eye every 4 (four) hours. 5 mL 0    levocetirizine (XYZAL) 5 MG tablet Take 1 tablet (5 mg total) by mouth every evening. (Patient not taking: Reported on 4/21/2025) 30 tablet 11    meloxicam (MOBIC) 15 MG tablet Take 1 tablet (15 mg total) by mouth once daily. (Patient not taking: Reported on 3/4/2024) 20 tablet 0    promethazine-dextromethorphan (PROMETHAZINE-DM) 6.25-15 mg/5 mL Syrp Take 10-15ml by mouth every 8 hours as needed for coughing  (Patient not taking: Reported on 4/21/2025) 240 mL 0     No current facility-administered medications for this visit.

## 2025-07-25 ENCOUNTER — OFFICE VISIT (OUTPATIENT)
Dept: FAMILY MEDICINE | Facility: CLINIC | Age: 65
End: 2025-07-25
Payer: COMMERCIAL

## 2025-07-25 VITALS
OXYGEN SATURATION: 99 % | WEIGHT: 194.31 LBS | SYSTOLIC BLOOD PRESSURE: 144 MMHG | TEMPERATURE: 99 F | HEART RATE: 62 BPM | BODY MASS INDEX: 31.23 KG/M2 | DIASTOLIC BLOOD PRESSURE: 94 MMHG | HEIGHT: 66 IN

## 2025-07-25 DIAGNOSIS — L02.01 ABSCESS OF FACE: Primary | ICD-10-CM

## 2025-07-25 DIAGNOSIS — I10 PRIMARY HYPERTENSION: ICD-10-CM

## 2025-07-25 PROCEDURE — 99999 PR PBB SHADOW E&M-EST. PATIENT-LVL IV: CPT | Mod: PBBFAC,,,

## 2025-07-25 RX ORDER — CLINDAMYCIN PHOSPHATE 10 MG/G
GEL TOPICAL 2 TIMES DAILY
Qty: 30 G | Refills: 1 | Status: SHIPPED | OUTPATIENT
Start: 2025-07-25 | End: 2025-08-04

## 2025-07-25 RX ORDER — OLMESARTAN MEDOXOMIL 40 MG/1
40 TABLET ORAL DAILY
Qty: 30 TABLET | Refills: 0 | Status: SHIPPED | OUTPATIENT
Start: 2025-07-25 | End: 2025-08-24

## 2025-07-25 NOTE — PROGRESS NOTES
"             Family Medicine      Patient Name: Akiko Esparza  MRN: [id number]  : 1960    PCP: Nina, Primary Doctor       HPI      Akiko Esparza is a 64 y.o. female with multiple medical diagnoses as listed in the medical history and problem list that presents for   Chief Complaint   Patient presents with    Abscess     Possible abscess on face         History of Present Illness    Patient presents today with a facial bump. She reports a facial bump present for approximately 2 months or longer. The lesion is growing and fluctuating in size with intermittent pain and tenderness upon palpation. There was a small amount of pus drainage. She has attempted home treatment including hot compresses and occasional application of alcohol, noting that heat temporarily reduced the bump's size. She attempted to squeeze the lesion but was unable to do so without causing self-injury.     She reports long-standing uncontrolled hypertension, noting she has not achieved a blood pressure reading of 120/80 in years. She currently takes amlodipine 5 mg and olmesartan 20 mg in the morning. She expresses difficulty with medication adherence, stating she "can't stand taking them pills." She has a home blood pressure monitoring device and will track blood pressure readings.      ROS:  General: -fever, -chills, -fatigue, -weight gain, -weight loss  Eyes: -vision changes, -redness, -discharge  ENT: -ear pain, -nasal congestion, -sore throat  Cardiovascular: -chest pain, -palpitations, -lower extremity edema  Respiratory: -cough, -shortness of breath  Gastrointestinal: -abdominal pain, -nausea, -vomiting, -diarrhea, -constipation, -blood in stool  Genitourinary: -dysuria, -hematuria, -frequency  Musculoskeletal: -joint pain, -muscle pain  Skin: -rash, +lesion, +lumps/masses, +skin growth, +facial pain  Neurological: -headache, -dizziness, -numbness, -tingling  Psychiatric: -anxiety, -depression, -sleep difficulty              History  "     Past Medical History:  Past Medical History:   Diagnosis Date    GERD (gastroesophageal reflux disease)     Hypertension        Past Surgical History:  Past Surgical History:   Procedure Laterality Date     SECTION       &        Social History:  Social History[1]    Family History:  Family History   Problem Relation Name Age of Onset    Arthritis Mother Neumann     Diabetes Mother Neumann     Hypertension Mother Neumann     Vision loss Mother Neumann         wears glasses    Arthritis Father Ulyseses     Diabetes Father Ulyseses         borderline-DrDain's are monitoring    Hypertension Father Ulyseses     Vision loss Father Ulyseses         wears glasses    Arthritis Sister Bisi     Asthma Sister Bisi     Hypertension Sister Bisi         borderline    Vision loss Sister Bisi         wears glasses    Vision loss Daughter Devain         wears glasses    Stroke Paternal Grandfather Pete     Breast cancer Neg Hx      Colon cancer Neg Hx      Ovarian cancer Neg Hx         Allergies and Medications: (updated and reviewed)  Review of patient's allergies indicates:  No Known Allergies  Current Medications[2]    Patient Care Team:  Nina, Primary Doctor as PCP - General         Exam      Review of Systems:  (as noted above)      Physical Exam:  Physical Exam  Vitals reviewed.   Constitutional:       General: She is not in acute distress.     Appearance: Normal appearance. She is normal weight. She is not ill-appearing.   HENT:      Head: Normocephalic and atraumatic.   Eyes:      Extraocular Movements: Extraocular movements intact.      Pupils: Pupils are equal, round, and reactive to light.   Cardiovascular:      Rate and Rhythm: Normal rate.      Pulses: Normal pulses.   Pulmonary:      Effort: Pulmonary effort is normal.   Abdominal:      General: Abdomen is flat.   Musculoskeletal:         General: Normal range of motion.      Cervical back: Normal range of motion.   Skin:     Findings: Abscess (~ .5 in) present.  "  Neurological:      Mental Status: She is alert and oriented to person, place, and time. Mental status is at baseline.   Psychiatric:         Mood and Affect: Mood normal.         Behavior: Behavior normal.           Vitals:    07/25/25 0712 07/25/25 0714   BP: (!) 142/88 (!) 144/94   Pulse: 62    Temp: 98.5 °F (36.9 °C)    TempSrc: Oral    SpO2: 99%    Weight: 88.2 kg (194 lb 5.4 oz)    Height: 5' 6" (1.676 m)       Body mass index is 31.37 kg/m².             Assessment & Plan      1. Abscess of face  - clindamycin phosphate 1% 1 % gel; Apply topically 2 (two) times daily. for 10 days  Dispense: 30 g; Refill: 1    2. Primary hypertension  - olmesartan (BENICAR) 40 MG tablet; Take 1 tablet (40 mg total) by mouth once daily.  Dispense: 30 tablet; Refill: 0       Assessment & Plan    IMPRESSION:  - Assessed facial abscess.  - Opted for conservative management with topical antibiotic due to facial location.  - Increased olmesartan dose from 20 mg to 40 mg daily to better control HTN.  - Plan to potentially combine olmesartan and amlodipine into single pill if BP normalizes.    CUTANEOUS ABSCESS OF FACE:  - Identified a facial bump present for about 2 months as an abscess (similar to a boil) with purulent collection underneath due to bacteria, dirt, and oils on the skin.  - Patient reports intermittent growth, occasional pain, and pus discharge.  - Prescribed Clindamycin gel to be applied 2-3 times daily for 7-10 days and recommended discontinuing use of alcohol on the abscess.  - Captured photographic documentation for medical record.  - Educated patient about abscess formation process.    ESSENTIAL HYPERTENSION:  - Blood pressure remains elevated with consistently high readings over years, with no readings of 120/80 observed.  - Continued amlodipine 5 mg daily and increased olmesartan dose from 20 mg to 40 mg daily to better control hypertension.  - Discussed importance of medication adherence and instructed patient " to monitor and record blood pressure readings at home.    FOLLOW-UP:  - Scheduled follow up in 1 month to reassess blood pressure control and evaluate response to treatments.           --------------------------------------------      Health Maintenance:  Health Maintenance         Date Due Completion Date    Hepatitis C Screening Never done ---    HIV Screening Never done ---    TETANUS VACCINE Never done ---    Hemoglobin A1c (Diabetic Prevention Screening) Never done ---    Colorectal Cancer Screening Never done ---    Shingles Vaccine (1 of 2) Never done ---    Pneumococcal Vaccines (Age 50+) (1 of 1 - PCV) Never done ---    RSV Vaccine (Age 60+ and Pregnant patients) (1 - Risk 60-74 years 1-dose series) Never done ---    COVID-19 Vaccine (4 - 2024-25 season) 09/01/2024 1/12/2022    Mammogram 04/05/2025 4/5/2023    Influenza Vaccine (1) 09/01/2025 10/15/2012    Cervical Cancer Screening 03/15/2028 4/5/2023    Lipid Panel 07/01/2029 7/1/2024            Health maintenance reviewed    Follow Up:  No follow-ups on file.       - The patient is given an After Visit Summary that lists all medications with directions, allergies, education, orders placed during this encounter and follow-up instructions.      - I have reviewed the patient's medical information including past medical, family, and social history sections including the medications and allergies.      - We discussed the patient's current medications.     This note was generated with the assistance of ambient listening technology. Verbal consent was obtained by the patient and accompanying visitor(s) for the recording of patient appointment to facilitate this note. I attest to having reviewed and edited the generated note for accuracy, though some syntax or spelling errors may persist. Please contact the author of this note for any clarification.      This note was created by combination of typed  and MModal dictation.  Transcription errors may be  present.  If there are any questions, please contact me.     Kristie Holland PA-C  [facility] - Primary [facility]               [1]   Social History  Socioeconomic History    Marital status: Single    Number of children: 2   Occupational History    Occupation:      Comment:    Tobacco Use    Smoking status: Never    Smokeless tobacco: Never   Substance and Sexual Activity    Alcohol use: Not Currently     Alcohol/week: 0.8 standard drinks of alcohol     Types: 1 Standard drinks or equivalent per week    Drug use: No    Sexual activity: Not Currently     Partners: Male     Birth control/protection: Post-menopausal   [2]   Current Outpatient Medications   Medication Sig Dispense Refill    amLODIPine (NORVASC) 5 MG tablet Take 5 mg by mouth.      diclofenac sodium (VOLTAREN) 1 % Gel Apply 2 g topically once daily. 100 g 3    fluticasone propionate (FLONASE) 50 mcg/actuation nasal spray 2 sprays (100 mcg total) by Each Nostril route once daily. 15 g 0    azelastine (ASTELIN) 137 mcg (0.1 %) nasal spray 2 sprays (274 mcg total) by Nasal route 2 (two) times daily. 30 mL 0    benzonatate (TESSALON) 200 MG capsule Take 1 capsule (200 mg total) by mouth 3 (three) times daily as needed for Cough. (Patient not taking: Reported on 7/25/2025) 30 capsule 1    clindamycin phosphate 1% 1 % gel Apply topically 2 (two) times daily. for 10 days 30 g 1    estradioL (ESTRACE) 0.01 % (0.1 mg/gram) vaginal cream Place 0.5 g vaginally every evening. Can use the applicator provided, or use pea sized amount on finger, apply vaginally at night 2-3 times per week at night before bed. (Patient not taking: Reported on 2/28/2025) 42.5 g 0    famotidine (PEPCID) 40 MG tablet Take 40 mg by mouth.      gentamicin (GARAMYCIN) 0.3 % ophthalmic solution Place 1 drop into the right eye every 4 (four) hours. (Patient not taking: Reported on 7/25/2025) 5 mL 0    levocetirizine (XYZAL) 5 MG tablet Take 1 tablet  (5 mg total) by mouth every evening. (Patient not taking: Reported on 4/21/2025) 30 tablet 11    meloxicam (MOBIC) 15 MG tablet Take 1 tablet (15 mg total) by mouth once daily. (Patient not taking: Reported on 7/25/2025) 20 tablet 0    olmesartan (BENICAR) 40 MG tablet Take 1 tablet (40 mg total) by mouth once daily. 30 tablet 0    promethazine-dextromethorphan (PROMETHAZINE-DM) 6.25-15 mg/5 mL Syrp Take 10-15ml by mouth every 8 hours as needed for coughing (Patient not taking: Reported on 2/28/2025) 240 mL 0    tirzepatide, weight loss, (ZEPBOUND) 2.5 mg/0.5 mL PnIj Inject 2.5 mg into the skin. (Patient not taking: Reported on 7/25/2025)       No current facility-administered medications for this visit.

## 2025-08-08 ENCOUNTER — OFFICE VISIT (OUTPATIENT)
Dept: FAMILY MEDICINE | Facility: CLINIC | Age: 65
End: 2025-08-08
Payer: COMMERCIAL

## 2025-08-08 VITALS
HEIGHT: 66 IN | OXYGEN SATURATION: 96 % | BODY MASS INDEX: 31.45 KG/M2 | TEMPERATURE: 99 F | SYSTOLIC BLOOD PRESSURE: 140 MMHG | WEIGHT: 195.69 LBS | HEART RATE: 66 BPM | DIASTOLIC BLOOD PRESSURE: 92 MMHG

## 2025-08-08 DIAGNOSIS — K21.9 GASTROESOPHAGEAL REFLUX DISEASE, UNSPECIFIED WHETHER ESOPHAGITIS PRESENT: ICD-10-CM

## 2025-08-08 DIAGNOSIS — I10 PRIMARY HYPERTENSION: ICD-10-CM

## 2025-08-08 DIAGNOSIS — M54.40 CHRONIC LOW BACK PAIN WITH SCIATICA, SCIATICA LATERALITY UNSPECIFIED, UNSPECIFIED BACK PAIN LATERALITY: ICD-10-CM

## 2025-08-08 DIAGNOSIS — R10.13 EPIGASTRIC ABDOMINAL PAIN: Primary | ICD-10-CM

## 2025-08-08 DIAGNOSIS — G89.29 CHRONIC LOW BACK PAIN WITH SCIATICA, SCIATICA LATERALITY UNSPECIFIED, UNSPECIFIED BACK PAIN LATERALITY: ICD-10-CM

## 2025-08-08 PROCEDURE — 99999 PR PBB SHADOW E&M-EST. PATIENT-LVL V: CPT | Mod: PBBFAC,,,

## 2025-08-08 RX ORDER — PANTOPRAZOLE SODIUM 20 MG/1
20 TABLET, DELAYED RELEASE ORAL DAILY
Qty: 90 TABLET | Refills: 3 | Status: SHIPPED | OUTPATIENT
Start: 2025-08-08 | End: 2026-08-08

## 2025-08-08 RX ORDER — LIDOCAINE 50 MG/G
1 PATCH TOPICAL DAILY
Qty: 30 PATCH | Refills: 0 | Status: SHIPPED | OUTPATIENT
Start: 2025-08-08 | End: 2025-09-07

## 2025-08-08 NOTE — PROGRESS NOTES
Family Medicine      Patient Name: Akiko Esparza  MRN: [id number]  : 1960    PCP: Nina, Primary Doctor       HPI      Akiko Esparza is a 64 y.o. female with multiple medical diagnoses as listed in the medical history and problem list that presents for   Chief Complaint   Patient presents with    GI Problem       Ms. Wharton presents today with complaints of abdominal pain for the past 2 weeks. Her symptoms include generalized pain and excassive gas. She is having bowel movements and passing gas normally. She has a history of GERD, but she has never taken the prescribed Pepcid. She has been taking Aleve Muscle and Back (220mg) 2 pills nightly for the past 2-3 weeks for back pain. Otherwise, she has not been eating any newly acidic foods or beverages.            History      Past Medical History:  Past Medical History:   Diagnosis Date    GERD (gastroesophageal reflux disease)     Hypertension        Past Surgical History:  Past Surgical History:   Procedure Laterality Date     SECTION       &        Social History:  Social History[1]    Family History:  Family History   Problem Relation Name Age of Onset    Arthritis Mother Neumann     Diabetes Mother Neumann     Hypertension Mother Neumann     Vision loss Mother Nel         wears glasses    Arthritis Father Ulyseses     Diabetes Father Ulyseses         borderline-Dr.'s are monitoring    Hypertension Father Ulyseses     Vision loss Father Ulyseses         wears glasses    Arthritis Sister Bisi     Asthma Sister Bisi     Hypertension Sister Bisi         borderline    Vision loss Sister Bisi         wears glasses    Vision loss Daughter Devain         wears glasses    Stroke Paternal Grandfather Pete     Breast cancer Neg Hx      Colon cancer Neg Hx      Ovarian cancer Neg Hx         Allergies and Medications: (updated and reviewed)  Review of patient's allergies indicates:  No Known Allergies  Current Medications[2]    Patient Care  "Team:  No, Primary Doctor as PCP - General         Exam      Review of Systems:  (as noted above)  Review of Systems    Physical Exam:  Physical Exam  Vitals reviewed.   Constitutional:       General: She is not in acute distress.     Appearance: Normal appearance. She is normal weight. She is not ill-appearing.   HENT:      Head: Normocephalic and atraumatic.   Eyes:      Extraocular Movements: Extraocular movements intact.      Pupils: Pupils are equal, round, and reactive to light.   Cardiovascular:      Rate and Rhythm: Normal rate.      Pulses: Normal pulses.   Pulmonary:      Effort: Pulmonary effort is normal.   Abdominal:      General: Abdomen is flat. Bowel sounds are normal. There is no distension.      Palpations: Abdomen is soft. There is no mass.      Tenderness: There is abdominal tenderness (epigastric).   Musculoskeletal:         General: Normal range of motion.      Cervical back: Normal range of motion.   Neurological:      Mental Status: She is alert and oriented to person, place, and time. Mental status is at baseline.   Psychiatric:         Mood and Affect: Mood normal.         Behavior: Behavior normal.       Vitals:    08/08/25 1106 08/08/25 1108   BP: (!) 142/88 (!) 140/92   Pulse: 66    Temp: 98.7 °F (37.1 °C)    TempSrc: Oral    SpO2: 96%    Weight: 88.7 kg (195 lb 10.5 oz)    Height: 5' 6" (1.676 m)       Body mass index is 31.58 kg/m².           Assessment & Plan      1. Epigastric abdominal pain  - Stop Aleve. Switch to Tylenol for pain.  - Pepto bismul for stomach discomfort.  - pantoprazole (PROTONIX) 20 MG tablet; Take 1 tablet (20 mg total) by mouth once daily.  Dispense: 90 tablet; Refill: 3  - Ambulatory referral/consult to Gastroenterology; Future    2. Gastroesophageal reflux disease, unspecified whether esophagitis present  - Start protonix.  - Ambulatory referral/consult to Gastroenterology; Future    3. Chronic low back pain with sciatica, sciatica laterality unspecified, " unspecified back pain laterality  - Stop Aleve. Tylenol ok.  - LIDOcaine (LIDODERM) 5 %; Place 1 patch onto the skin once daily. Remove & Discard patch within 12 hours or as directed by MD  Dispense: 30 patch; Refill: 0    4. Primary hypertension  - Patient has not taken BP medication for the morning.  - Compliant with Amlodipine 5mg and Olmesartan 40mg daily.           --------------------------------------------      Health Maintenance:  Health Maintenance         Date Due Completion Date    Hepatitis C Screening Never done ---    HIV Screening Never done ---    TETANUS VACCINE Never done ---    Hemoglobin A1c (Diabetic Prevention Screening) Never done ---    Colorectal Cancer Screening Never done ---    Shingles Vaccine (1 of 2) Never done ---    Pneumococcal Vaccines (Age 50+) (1 of 1 - PCV) Never done ---    RSV Vaccine (Age 60+ and Pregnant patients) (1 - Risk 60-74 years 1-dose series) Never done ---    COVID-19 Vaccine (4 - 2024-25 season) 09/01/2024 1/12/2022    Mammogram 04/05/2025 4/5/2023    Influenza Vaccine (1) 09/01/2025 10/15/2012    Cervical Cancer Screening 03/15/2028 4/5/2023    Lipid Panel 07/01/2029 7/1/2024            Health maintenance reviewed    Follow Up:  No follow-ups on file.       - The patient is given an After Visit Summary that lists all medications with directions, allergies, education, orders placed during this encounter and follow-up instructions.      - I have reviewed the patient's medical information including past medical, family, and social history sections including the medications and allergies.      - We discussed the patient's current medications.     This note was created by combination of typed  and MModal dictation.  Transcription errors may be present.  If there are any questions, please contact me.     Kristie Holland PA-C  [facility] - [facility]               [1]   Social History  Socioeconomic History    Marital status: Single     Number of children: 2   Occupational History    Occupation:      Comment:    Tobacco Use    Smoking status: Never    Smokeless tobacco: Never   Substance and Sexual Activity    Alcohol use: Not Currently     Alcohol/week: 0.8 standard drinks of alcohol     Types: 1 Standard drinks or equivalent per week    Drug use: No    Sexual activity: Not Currently     Partners: Male     Birth control/protection: Post-menopausal   [2]   Current Outpatient Medications   Medication Sig Dispense Refill    amLODIPine (NORVASC) 5 MG tablet Take 5 mg by mouth.      diclofenac sodium (VOLTAREN) 1 % Gel Apply 2 g topically once daily. 100 g 3    fluticasone propionate (FLONASE) 50 mcg/actuation nasal spray 2 sprays (100 mcg total) by Each Nostril route once daily. 15 g 0    olmesartan (BENICAR) 40 MG tablet Take 1 tablet (40 mg total) by mouth once daily. 30 tablet 0    promethazine-dextromethorphan (PROMETHAZINE-DM) 6.25-15 mg/5 mL Syrp Take 10-15ml by mouth every 8 hours as needed for coughing 240 mL 0    azelastine (ASTELIN) 137 mcg (0.1 %) nasal spray 2 sprays (274 mcg total) by Nasal route 2 (two) times daily. 30 mL 0    benzonatate (TESSALON) 200 MG capsule Take 1 capsule (200 mg total) by mouth 3 (three) times daily as needed for Cough. (Patient not taking: Reported on 8/8/2025) 30 capsule 1    clindamycin phosphate 1% 1 % gel Apply topically 2 (two) times daily. for 10 days 30 g 1    estradioL (ESTRACE) 0.01 % (0.1 mg/gram) vaginal cream Place 0.5 g vaginally every evening. Can use the applicator provided, or use pea sized amount on finger, apply vaginally at night 2-3 times per week at night before bed. (Patient not taking: Reported on 8/8/2025) 42.5 g 0    gentamicin (GARAMYCIN) 0.3 % ophthalmic solution Place 1 drop into the right eye every 4 (four) hours. (Patient not taking: Reported on 8/8/2025) 5 mL 0    levocetirizine (XYZAL) 5 MG tablet Take 1 tablet (5 mg total) by mouth every evening.  (Patient not taking: Reported on 4/21/2025) 30 tablet 11    LIDOcaine (LIDODERM) 5 % Place 1 patch onto the skin once daily. Remove & Discard patch within 12 hours or as directed by MD 30 patch 0    meloxicam (MOBIC) 15 MG tablet Take 1 tablet (15 mg total) by mouth once daily. (Patient not taking: Reported on 3/4/2024) 20 tablet 0    pantoprazole (PROTONIX) 20 MG tablet Take 1 tablet (20 mg total) by mouth once daily. 90 tablet 3    tirzepatide, weight loss, (ZEPBOUND) 2.5 mg/0.5 mL PnIj Inject 2.5 mg into the skin. (Patient not taking: Reported on 8/8/2025)       No current facility-administered medications for this visit.

## 2025-08-26 ENCOUNTER — OFFICE VISIT (OUTPATIENT)
Dept: PODIATRY | Facility: CLINIC | Age: 65
End: 2025-08-26
Payer: COMMERCIAL

## 2025-08-26 VITALS
DIASTOLIC BLOOD PRESSURE: 96 MMHG | HEART RATE: 64 BPM | SYSTOLIC BLOOD PRESSURE: 163 MMHG | HEIGHT: 66 IN | BODY MASS INDEX: 30.88 KG/M2 | WEIGHT: 192.13 LBS

## 2025-08-26 DIAGNOSIS — M79.671 BILATERAL FOOT PAIN: ICD-10-CM

## 2025-08-26 DIAGNOSIS — M72.2 PLANTAR FASCIITIS: Primary | ICD-10-CM

## 2025-08-26 DIAGNOSIS — M79.672 BILATERAL FOOT PAIN: ICD-10-CM

## 2025-08-26 PROCEDURE — 3080F DIAST BP >= 90 MM HG: CPT | Mod: CPTII,S$GLB,, | Performed by: PODIATRIST

## 2025-08-26 PROCEDURE — 3077F SYST BP >= 140 MM HG: CPT | Mod: CPTII,S$GLB,, | Performed by: PODIATRIST

## 2025-08-26 PROCEDURE — 99999 PR PBB SHADOW E&M-EST. PATIENT-LVL IV: CPT | Mod: PBBFAC,,, | Performed by: PODIATRIST

## 2025-08-26 PROCEDURE — 99214 OFFICE O/P EST MOD 30 MIN: CPT | Mod: S$GLB,,, | Performed by: PODIATRIST

## 2025-08-26 PROCEDURE — 1159F MED LIST DOCD IN RCRD: CPT | Mod: CPTII,S$GLB,, | Performed by: PODIATRIST

## 2025-08-26 PROCEDURE — 3008F BODY MASS INDEX DOCD: CPT | Mod: CPTII,S$GLB,, | Performed by: PODIATRIST

## 2025-08-26 PROCEDURE — 4010F ACE/ARB THERAPY RXD/TAKEN: CPT | Mod: CPTII,S$GLB,, | Performed by: PODIATRIST

## 2025-08-26 RX ORDER — METHYLPREDNISOLONE 4 MG/1
TABLET ORAL
Qty: 1 EACH | Refills: 0 | Status: SHIPPED | OUTPATIENT
Start: 2025-08-26

## 2025-08-27 ENCOUNTER — HOSPITAL ENCOUNTER (OUTPATIENT)
Dept: RADIOLOGY | Facility: HOSPITAL | Age: 65
Discharge: HOME | End: 2025-08-27
Attending: PODIATRIST
Payer: COMMERCIAL

## 2025-08-27 DIAGNOSIS — M72.2 PLANTAR FASCIITIS: ICD-10-CM

## 2025-08-27 DIAGNOSIS — M79.672 BILATERAL FOOT PAIN: ICD-10-CM

## 2025-08-27 DIAGNOSIS — M79.671 BILATERAL FOOT PAIN: ICD-10-CM

## 2025-08-27 PROCEDURE — 73630 X-RAY EXAM OF FOOT: CPT | Mod: TC,50

## 2025-08-27 PROCEDURE — 73630 X-RAY EXAM OF FOOT: CPT | Mod: 26,,, | Performed by: RADIOLOGY
